# Patient Record
Sex: FEMALE | Race: BLACK OR AFRICAN AMERICAN | NOT HISPANIC OR LATINO | URBAN - METROPOLITAN AREA
[De-identification: names, ages, dates, MRNs, and addresses within clinical notes are randomized per-mention and may not be internally consistent; named-entity substitution may affect disease eponyms.]

---

## 2020-08-16 ENCOUNTER — INPATIENT (INPATIENT)
Facility: HOSPITAL | Age: 80
LOS: 3 days | Discharge: HOME | End: 2020-08-20
Attending: INTERNAL MEDICINE | Admitting: INTERNAL MEDICINE
Payer: MEDICARE

## 2020-08-16 VITALS
SYSTOLIC BLOOD PRESSURE: 147 MMHG | TEMPERATURE: 98 F | DIASTOLIC BLOOD PRESSURE: 55 MMHG | HEART RATE: 91 BPM | RESPIRATION RATE: 16 BRPM

## 2020-08-16 LAB
APPEARANCE UR: CLEAR — SIGNIFICANT CHANGE UP
BASE EXCESS BLDV CALC-SCNC: 3.7 MMOL/L — SIGNIFICANT CHANGE UP
BILIRUB UR-MCNC: NEGATIVE — SIGNIFICANT CHANGE UP
BLOOD GAS VENOUS - CREATININE: 0.73 MG/DL — SIGNIFICANT CHANGE UP (ref 0.5–1.3)
BLOOD GAS VENOUS - FIO2: 21 — SIGNIFICANT CHANGE UP
BLOOD UR QL VISUAL: NEGATIVE — SIGNIFICANT CHANGE UP
CHLORIDE BLDV-SCNC: 104 MMOL/L — SIGNIFICANT CHANGE UP (ref 96–108)
COLOR SPEC: COLORLESS — SIGNIFICANT CHANGE UP
GAS PNL BLDV: 135 MMOL/L — LOW (ref 136–146)
GLUCOSE BLDV-MCNC: 130 MG/DL — HIGH (ref 70–99)
GLUCOSE UR-MCNC: NEGATIVE — SIGNIFICANT CHANGE UP
HCO3 BLDV-SCNC: 26 MMOL/L — SIGNIFICANT CHANGE UP (ref 20–27)
HCT VFR BLD CALC: 41.5 % — SIGNIFICANT CHANGE UP (ref 34.5–45)
HCT VFR BLDV CALC: 41.6 % — SIGNIFICANT CHANGE UP (ref 34.5–45)
HGB BLD-MCNC: 12.8 G/DL — SIGNIFICANT CHANGE UP (ref 11.5–15.5)
HGB BLDV-MCNC: 13.5 G/DL — SIGNIFICANT CHANGE UP (ref 11.5–15.5)
KETONES UR-MCNC: NEGATIVE — SIGNIFICANT CHANGE UP
LACTATE BLDV-MCNC: 2.3 MMOL/L — HIGH (ref 0.5–2)
LEUKOCYTE ESTERASE UR-ACNC: NEGATIVE — SIGNIFICANT CHANGE UP
MCHC RBC-ENTMCNC: 22.4 PG — LOW (ref 27–34)
MCHC RBC-ENTMCNC: 30.8 % — LOW (ref 32–36)
MCV RBC AUTO: 72.6 FL — LOW (ref 80–100)
NITRITE UR-MCNC: NEGATIVE — SIGNIFICANT CHANGE UP
PCO2 BLDV: 44 MMHG — SIGNIFICANT CHANGE UP (ref 41–51)
PH BLDV: 7.42 PH — SIGNIFICANT CHANGE UP (ref 7.32–7.43)
PH UR: 6.5 — SIGNIFICANT CHANGE UP (ref 5–8)
PLATELET # BLD AUTO: 352 K/UL — SIGNIFICANT CHANGE UP (ref 150–400)
PMV BLD: 10.8 FL — SIGNIFICANT CHANGE UP (ref 7–13)
PO2 BLDV: < 24 MMHG — LOW (ref 35–40)
POTASSIUM BLDV-SCNC: 3.7 MMOL/L — SIGNIFICANT CHANGE UP (ref 3.4–4.5)
PROT UR-MCNC: NEGATIVE — SIGNIFICANT CHANGE UP
RBC # BLD: 5.72 M/UL — HIGH (ref 3.8–5.2)
RBC # FLD: 14.8 % — HIGH (ref 10.3–14.5)
SAO2 % BLDV: 22.9 % — LOW (ref 60–85)
SP GR SPEC: 1 — LOW (ref 1–1.04)
UROBILINOGEN FLD QL: NORMAL — SIGNIFICANT CHANGE UP
WBC # BLD: 13.71 K/UL — HIGH (ref 3.8–10.5)
WBC # FLD AUTO: 13.71 K/UL — HIGH (ref 3.8–10.5)

## 2020-08-16 RX ORDER — SODIUM CHLORIDE 9 MG/ML
1000 INJECTION, SOLUTION INTRAVENOUS ONCE
Refills: 0 | Status: COMPLETED | OUTPATIENT
Start: 2020-08-16 | End: 2020-08-16

## 2020-08-16 RX ORDER — ONDANSETRON 8 MG/1
4 TABLET, FILM COATED ORAL ONCE
Refills: 0 | Status: COMPLETED | OUTPATIENT
Start: 2020-08-16 | End: 2020-08-16

## 2020-08-16 RX ORDER — FAMOTIDINE 10 MG/ML
20 INJECTION INTRAVENOUS ONCE
Refills: 0 | Status: COMPLETED | OUTPATIENT
Start: 2020-08-16 | End: 2020-08-16

## 2020-08-16 RX ORDER — ONDANSETRON 8 MG/1
4 TABLET, FILM COATED ORAL ONCE
Refills: 0 | Status: DISCONTINUED | OUTPATIENT
Start: 2020-08-16 | End: 2020-08-16

## 2020-08-16 RX ORDER — MORPHINE SULFATE 50 MG/1
4 CAPSULE, EXTENDED RELEASE ORAL ONCE
Refills: 0 | Status: DISCONTINUED | OUTPATIENT
Start: 2020-08-16 | End: 2020-08-16

## 2020-08-16 RX ADMIN — Medication 30 MILLILITER(S): at 23:51

## 2020-08-16 RX ADMIN — FAMOTIDINE 20 MILLIGRAM(S): 10 INJECTION INTRAVENOUS at 23:51

## 2020-08-16 RX ADMIN — SODIUM CHLORIDE 1000 MILLILITER(S): 9 INJECTION, SOLUTION INTRAVENOUS at 23:53

## 2020-08-16 NOTE — ED ADULT NURSE NOTE - SUICIDE SCREENING QUESTION 3
Discharge instructions given and discussed. RX for clindamycin given and pt educated. Pt educated to come back to ER for new or worsening symptoms and follow up with PCP as instructed. Pt verbalized understanding. VSS. Pt  Discharged in stable condition.     
No

## 2020-08-16 NOTE — ED PROVIDER NOTE - OBJECTIVE STATEMENT
79 y/o female with HTN presenting with abdominal pain and vomiting since this morning. Patient was awoken from sleep with abdominal pain and ran to the bathroom to vomit. Patient localizes abdominal pain to b/l upper quadrants and described as pressure. Patient had 3-4 episodes of non-bloody emesis. Patient has associated palpitations (denies chest pain), nausea, chills but denies diarrhea (no blood in stool), fevers, or cough. Patient denies dysuria but does report increased urinary frequency.

## 2020-08-16 NOTE — ED ADULT TRIAGE NOTE - CHIEF COMPLAINT QUOTE
pt  c/o abd pain, nausea and multiple episodes of NBNB vomiting since 5 am. Pt states she hasn't been able to keep anything down. pt denies any new or different foods. pt LBM was this normal with no dark stools noted. No complaints of chest pain, headache, dizziness,  SOB, fever, chills verbalized.

## 2020-08-16 NOTE — ED PROVIDER NOTE - ATTENDING CONTRIBUTION TO CARE
80F p/w abd pain and vomiting x 1 day.  Multiple episodes of vomiting.  Unable to macario PO.  Woke up out of sleep with the pain.  Upper quadrant pain.  NBNB vomiting.  No diarrhea.  (+)chills no fever.  Also having palps.  RUQ ttp, epig ttp.  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  H/O HTN and CAYDEN.  Here having pain, nausea/retching, chills, febrile, tachycardic.  Rx zosyn, check labs and US - concern for acute karolyn - surg eval.  VS:  unremarkable    GEN - malaise, mild distress retching and chills;   A+O x3   HEAD - NC/AT     ENT - PEERL, EOMI, mucous membranes   dry, no discharge      NECK: Neck supple, non-tender without lymphadenopathy, no masses, no JVD  PULM - CTA b/l,  symmetric breath sounds  COR -  normal heart sounds    ABD - , ND,epig and RUQ abd ttp, soft,  BACK - no CVA tenderness, nontender spine     EXTREMS - no edema, no deformity, warm and well perfused    SKIN - no rash    or bruising      NEUROLOGIC - alert, face symmetric, speech fluent, sensation nl, motor no focal deficit.

## 2020-08-16 NOTE — ED PROVIDER NOTE - PHYSICAL EXAMINATION
Gen: WDWN. Patient shaking from chills and in mild distress due to pain   HEENT: EOMI, no nasal discharge, mucous membranes moist  CV: RRR, +S1/S2, no M/R/G  Resp: CTAB, no W/R/R  GI: Abdomen soft non-distended, tender in RUQ and epigastric region, no masses  MSK: No open wounds, no bruising, no LE edema  Neuro: A&Ox4, following commands, moving all four extremities spontaneously  Skin: Mildly warm to touch

## 2020-08-16 NOTE — ED PROVIDER NOTE - NS ED ROS FT
Gen: Chills but denies fever, weight loss  CV: Palpitations but denies chest pain  Skin: Denies rash, erythema, color changes  Resp: Denies SOB, cough  Endo: Increased urinary frequency. Denies sensitivity to heat, cold  GI: B/l upper quadrant pain, 3-4 episodes of non-bloody emesis, nausea. Denies diarrhea, constipation  Msk: Denies back pain, LE swelling, extremity pain  : Increased frequency. Denies dysuria   Neuro: Denies LOC, weakness, seizures

## 2020-08-16 NOTE — ED PROVIDER NOTE - PROGRESS NOTE DETAILS
Steve Grady DO: received patient from Dr Espinoza. workup concerning for cholecystitis. evaluated by surgery team who requests admission to their service for further management

## 2020-08-16 NOTE — ED ADULT NURSE NOTE - OBJECTIVE STATEMENT
Pt presents to room 26, A&Ox3, ambulatory at baseline without assistance, here for evaluation of abdominal pain nausea, and multiple episodes of vomiting. Pt denies any fevers or urinary symptoms. Denies any dizziness, shortness of breath, palpitations, diarrhea, constipation, or chills. IV established in left ac with a 20g, labs drawn and sent, call bell in reach, side rails up, bed in locked position, md evaluation in progress, will continue to monitor. Pt presents to room 26, A&Ox3, ambulatory at baseline with a cane, pmhx of htn, here for evaluation of abdominal pain nausea, and multiple episodes of vomiting that started this morning. Pt denies any fevers or urinary symptoms. Denies any dizziness, shortness of breath, palpitations, diarrhea, constipation, or chills. IV established in left ac with a 20g, labs drawn and sent, call bell in reach, side rails up, bed in locked position, md evaluation in progress, will continue to monitor.

## 2020-08-16 NOTE — ED ADULT NURSE NOTE - NSIMPLEMENTINTERV_GEN_ALL_ED
Implemented All Universal Safety Interventions:  [place] to call system. Call bell, personal items and telephone within reach. Instruct patient to call for assistance. Room bathroom lighting operational. Non-slip footwear when patient is off stretcher. Physically safe environment: no spills, clutter or unnecessary equipment. Stretcher in lowest position, wheels locked, appropriate side rails in place.

## 2020-08-16 NOTE — ED PROVIDER NOTE - CLINICAL SUMMARY MEDICAL DECISION MAKING FREE TEXT BOX
79 y/o female with pmhx of HTN presenting with RUQ/epigastric abdominal pain and 3-4x of emesis with rectal temperature of 100.6 with associated palpitations and increased urinary frequency. RUQ ultrasound to r/o cholecystitis, 1L LR, and basics/lipase. EKG/Trops for palpitations and possible atypical ACS presentation. Malox/pepcid for belly pain and morphine if no response. 79 y/o female with pmhx of HTN presenting with RUQ/epigastric abdominal pain and 3-4x of emesis with rectal temperature of 100.6 with associated palpitations and increased urinary frequency. RUQ ultrasound to r/o cholecystitis, 1L LR, and basics/lipase. EKG/Trops for palpitations and possible atypical ACS presentation. UA/UC for urinary symptoms. Malox/pepcid/zofran for belly pain/vomiting and morphine if no response.

## 2020-08-17 DIAGNOSIS — Z90.710 ACQUIRED ABSENCE OF BOTH CERVIX AND UTERUS: Chronic | ICD-10-CM

## 2020-08-17 DIAGNOSIS — K81.9 CHOLECYSTITIS, UNSPECIFIED: ICD-10-CM

## 2020-08-17 LAB
ALBUMIN SERPL ELPH-MCNC: 3.7 G/DL — SIGNIFICANT CHANGE UP (ref 3.3–5)
ALP SERPL-CCNC: 50 U/L — SIGNIFICANT CHANGE UP (ref 40–120)
ALT FLD-CCNC: 53 U/L — HIGH (ref 4–33)
ANION GAP SERPL CALC-SCNC: 15 MMO/L — HIGH (ref 7–14)
AST SERPL-CCNC: 86 U/L — HIGH (ref 4–32)
BASE EXCESS BLDV CALC-SCNC: 2.4 MMOL/L — SIGNIFICANT CHANGE UP
BASOPHILS # BLD AUTO: 0.03 K/UL — SIGNIFICANT CHANGE UP (ref 0–0.2)
BASOPHILS NFR BLD AUTO: 0.2 % — SIGNIFICANT CHANGE UP (ref 0–2)
BILIRUB SERPL-MCNC: 0.8 MG/DL — SIGNIFICANT CHANGE UP (ref 0.2–1.2)
BLOOD GAS VENOUS - CREATININE: 0.7 MG/DL — SIGNIFICANT CHANGE UP (ref 0.5–1.3)
BUN SERPL-MCNC: 6 MG/DL — LOW (ref 7–23)
CALCIUM SERPL-MCNC: 9.4 MG/DL — SIGNIFICANT CHANGE UP (ref 8.4–10.5)
CHLORIDE BLDV-SCNC: 106 MMOL/L — SIGNIFICANT CHANGE UP (ref 96–108)
CHLORIDE SERPL-SCNC: 102 MMOL/L — SIGNIFICANT CHANGE UP (ref 98–107)
CO2 SERPL-SCNC: 20 MMOL/L — LOW (ref 22–31)
CREAT SERPL-MCNC: 0.65 MG/DL — SIGNIFICANT CHANGE UP (ref 0.5–1.3)
CULTURE RESULTS: SIGNIFICANT CHANGE UP
EOSINOPHIL # BLD AUTO: 0.02 K/UL — SIGNIFICANT CHANGE UP (ref 0–0.5)
EOSINOPHIL NFR BLD AUTO: 0.1 % — SIGNIFICANT CHANGE UP (ref 0–6)
GAS PNL BLDV: 136 MMOL/L — SIGNIFICANT CHANGE UP (ref 136–146)
GLUCOSE BLDV-MCNC: 134 MG/DL — HIGH (ref 70–99)
GLUCOSE SERPL-MCNC: 116 MG/DL — HIGH (ref 70–99)
HCO3 BLDV-SCNC: 25 MMOL/L — SIGNIFICANT CHANGE UP (ref 20–27)
HCT VFR BLDV CALC: 36.4 % — SIGNIFICANT CHANGE UP (ref 34.5–45)
HGB BLDV-MCNC: 11.8 G/DL — SIGNIFICANT CHANGE UP (ref 11.5–15.5)
IMM GRANULOCYTES NFR BLD AUTO: 0.3 % — SIGNIFICANT CHANGE UP (ref 0–1.5)
LACTATE BLDV-MCNC: 2 MMOL/L — SIGNIFICANT CHANGE UP (ref 0.5–2)
LIDOCAIN IGE QN: 22.6 U/L — SIGNIFICANT CHANGE UP (ref 7–60)
LYMPHOCYTES # BLD AUTO: 1.5 K/UL — SIGNIFICANT CHANGE UP (ref 1–3.3)
LYMPHOCYTES # BLD AUTO: 10.9 % — LOW (ref 13–44)
MAGNESIUM SERPL-MCNC: 1.9 MG/DL — SIGNIFICANT CHANGE UP (ref 1.6–2.6)
MONOCYTES # BLD AUTO: 0.82 K/UL — SIGNIFICANT CHANGE UP (ref 0–0.9)
MONOCYTES NFR BLD AUTO: 6 % — SIGNIFICANT CHANGE UP (ref 2–14)
NEUTROPHILS # BLD AUTO: 11.3 K/UL — HIGH (ref 1.8–7.4)
NEUTROPHILS NFR BLD AUTO: 82.5 % — HIGH (ref 43–77)
NRBC # FLD: 0 K/UL — SIGNIFICANT CHANGE UP (ref 0–0)
PCO2 BLDV: 50 MMHG — SIGNIFICANT CHANGE UP (ref 41–51)
PH BLDV: 7.36 PH — SIGNIFICANT CHANGE UP (ref 7.32–7.43)
PHOSPHATE SERPL-MCNC: 3.8 MG/DL — SIGNIFICANT CHANGE UP (ref 2.5–4.5)
PO2 BLDV: 26 MMHG — LOW (ref 35–40)
POTASSIUM BLDV-SCNC: 3.6 MMOL/L — SIGNIFICANT CHANGE UP (ref 3.4–4.5)
POTASSIUM SERPL-MCNC: 4.2 MMOL/L — SIGNIFICANT CHANGE UP (ref 3.5–5.3)
POTASSIUM SERPL-SCNC: 4.2 MMOL/L — SIGNIFICANT CHANGE UP (ref 3.5–5.3)
PROT SERPL-MCNC: 7 G/DL — SIGNIFICANT CHANGE UP (ref 6–8.3)
SAO2 % BLDV: 38.7 % — LOW (ref 60–85)
SARS-COV-2 RNA SPEC QL NAA+PROBE: SIGNIFICANT CHANGE UP
SODIUM SERPL-SCNC: 137 MMOL/L — SIGNIFICANT CHANGE UP (ref 135–145)
SPECIMEN SOURCE: SIGNIFICANT CHANGE UP
TROPONIN T, HIGH SENSITIVITY: 8 NG/L — SIGNIFICANT CHANGE UP (ref ?–14)
TROPONIN T, HIGH SENSITIVITY: 9 NG/L — SIGNIFICANT CHANGE UP (ref ?–14)

## 2020-08-17 PROCEDURE — 76705 ECHO EXAM OF ABDOMEN: CPT | Mod: 26

## 2020-08-17 PROCEDURE — 99223 1ST HOSP IP/OBS HIGH 75: CPT

## 2020-08-17 PROCEDURE — 74183 MRI ABD W/O CNTR FLWD CNTR: CPT | Mod: 26

## 2020-08-17 PROCEDURE — 99284 EMERGENCY DEPT VISIT MOD MDM: CPT

## 2020-08-17 RX ORDER — PIPERACILLIN AND TAZOBACTAM 4; .5 G/20ML; G/20ML
3.38 INJECTION, POWDER, LYOPHILIZED, FOR SOLUTION INTRAVENOUS ONCE
Refills: 0 | Status: COMPLETED | OUTPATIENT
Start: 2020-08-17 | End: 2020-08-17

## 2020-08-17 RX ORDER — PIPERACILLIN AND TAZOBACTAM 4; .5 G/20ML; G/20ML
3.38 INJECTION, POWDER, LYOPHILIZED, FOR SOLUTION INTRAVENOUS EVERY 8 HOURS
Refills: 0 | Status: DISCONTINUED | OUTPATIENT
Start: 2020-08-17 | End: 2020-08-19

## 2020-08-17 RX ORDER — OXYCODONE HYDROCHLORIDE 5 MG/1
5 TABLET ORAL EVERY 4 HOURS
Refills: 0 | Status: DISCONTINUED | OUTPATIENT
Start: 2020-08-17 | End: 2020-08-20

## 2020-08-17 RX ORDER — ENOXAPARIN SODIUM 100 MG/ML
40 INJECTION SUBCUTANEOUS DAILY
Refills: 0 | Status: DISCONTINUED | OUTPATIENT
Start: 2020-08-17 | End: 2020-08-20

## 2020-08-17 RX ORDER — ASPIRIN/CALCIUM CARB/MAGNESIUM 324 MG
81 TABLET ORAL DAILY
Refills: 0 | Status: DISCONTINUED | OUTPATIENT
Start: 2020-08-17 | End: 2020-08-20

## 2020-08-17 RX ORDER — ONDANSETRON 8 MG/1
4 TABLET, FILM COATED ORAL EVERY 6 HOURS
Refills: 0 | Status: DISCONTINUED | OUTPATIENT
Start: 2020-08-17 | End: 2020-08-20

## 2020-08-17 RX ORDER — SODIUM CHLORIDE 9 MG/ML
1000 INJECTION, SOLUTION INTRAVENOUS
Refills: 0 | Status: DISCONTINUED | OUTPATIENT
Start: 2020-08-17 | End: 2020-08-18

## 2020-08-17 RX ORDER — AMLODIPINE BESYLATE 2.5 MG/1
5 TABLET ORAL DAILY
Refills: 0 | Status: DISCONTINUED | OUTPATIENT
Start: 2020-08-17 | End: 2020-08-20

## 2020-08-17 RX ORDER — ACETAMINOPHEN 500 MG
975 TABLET ORAL ONCE
Refills: 0 | Status: COMPLETED | OUTPATIENT
Start: 2020-08-17 | End: 2020-08-17

## 2020-08-17 RX ORDER — OXYCODONE HYDROCHLORIDE 5 MG/1
2.5 TABLET ORAL EVERY 4 HOURS
Refills: 0 | Status: DISCONTINUED | OUTPATIENT
Start: 2020-08-17 | End: 2020-08-20

## 2020-08-17 RX ORDER — ACETAMINOPHEN 500 MG
650 TABLET ORAL EVERY 6 HOURS
Refills: 0 | Status: DISCONTINUED | OUTPATIENT
Start: 2020-08-17 | End: 2020-08-20

## 2020-08-17 RX ADMIN — ENOXAPARIN SODIUM 40 MILLIGRAM(S): 100 INJECTION SUBCUTANEOUS at 12:55

## 2020-08-17 RX ADMIN — PIPERACILLIN AND TAZOBACTAM 25 GRAM(S): 4; .5 INJECTION, POWDER, LYOPHILIZED, FOR SOLUTION INTRAVENOUS at 17:45

## 2020-08-17 RX ADMIN — AMLODIPINE BESYLATE 5 MILLIGRAM(S): 2.5 TABLET ORAL at 03:56

## 2020-08-17 RX ADMIN — SODIUM CHLORIDE 1000 MILLILITER(S): 9 INJECTION, SOLUTION INTRAVENOUS at 02:55

## 2020-08-17 RX ADMIN — PIPERACILLIN AND TAZOBACTAM 25 GRAM(S): 4; .5 INJECTION, POWDER, LYOPHILIZED, FOR SOLUTION INTRAVENOUS at 10:59

## 2020-08-17 RX ADMIN — SODIUM CHLORIDE 125 MILLILITER(S): 9 INJECTION, SOLUTION INTRAVENOUS at 03:55

## 2020-08-17 RX ADMIN — Medication 975 MILLIGRAM(S): at 01:30

## 2020-08-17 RX ADMIN — PIPERACILLIN AND TAZOBACTAM 200 GRAM(S): 4; .5 INJECTION, POWDER, LYOPHILIZED, FOR SOLUTION INTRAVENOUS at 01:57

## 2020-08-17 RX ADMIN — PIPERACILLIN AND TAZOBACTAM 3.38 GRAM(S): 4; .5 INJECTION, POWDER, LYOPHILIZED, FOR SOLUTION INTRAVENOUS at 02:30

## 2020-08-17 RX ADMIN — Medication 975 MILLIGRAM(S): at 02:10

## 2020-08-17 RX ADMIN — MORPHINE SULFATE 4 MILLIGRAM(S): 50 CAPSULE, EXTENDED RELEASE ORAL at 01:19

## 2020-08-17 RX ADMIN — MORPHINE SULFATE 4 MILLIGRAM(S): 50 CAPSULE, EXTENDED RELEASE ORAL at 00:08

## 2020-08-17 RX ADMIN — ONDANSETRON 4 MILLIGRAM(S): 8 TABLET, FILM COATED ORAL at 00:08

## 2020-08-17 RX ADMIN — Medication 81 MILLIGRAM(S): at 12:54

## 2020-08-17 NOTE — H&P ADULT - ASSESSMENT
ASSESSMENT:  Mattie is a very pleasant 80 year old lady with acute cholecystitis    PLAN:  - Admit to surgery, Dr. Davila  - MRCP to evaluate for choledocholithiasis  - NPO, IVF  - IV Abx: Zosyn  - Pain control    Jose Aguilar, PGY 3  Surgery [id number]

## 2020-08-17 NOTE — CONSULT NOTE ADULT - ATTENDING COMMENTS
cholecystitis with negative MRCP for stones in the biliary system.    No endoscopic intervention at this time.
Patient without history of ischemic heart disease or CVA. Her HTN is well managed with amlodipine. She denies exertional chest pain or dyspnea and is able to climb up to 3 flights of stairs and walk 5 blocks without stopping -- independent of ADLs and IADLs. Low RCRI score. EKG with ST inversions in V2, V3, yet poor quality EKG -- would repeat. This is a low risk patient for a laparoscopic cholecystectomy, i.e. a low risk procedure. She is medically optimized for surgery.

## 2020-08-17 NOTE — H&P ADULT - NSHPLABSRESULTS_GEN_ALL_CORE
< from: US Abdomen Limited (08.17.20 @ 00:54) >      EXAM:  US ABDOMEN LIMITED        PROCEDURE DATE:  Aug 17 2020         INTERPRETATION:  CLINICAL INFORMATION: Right upper quadrant pain and tenderness    COMPARISON: None available.    TECHNIQUE: Sonography of the right upper quadrant.    FINDINGS:    Liver: Liver measures 18.5 cm with normal contour, texture and echogenicity. Hepatopedal flow in the main portal vein.  Bile ducts: Extrahepatic and mild intrahepatic biliary ductal dilatation. Common bile duct measures 10 mm. Distal common bile duct is obscured by bowel gas.  Gallbladder: Cholelithiasis and sludge with pericholecystic fluid and positive sonographic Ortega sign. Gallbladder wall is borderline thickened measuring 3 mm.  Pancreas: Visualized portions are within normal limits.  Right kidney: 11.0 cm. No hydronephrosis. Right renal cyst measures 3 cm  Ascites: None.  IVC: Visualized portions are within normal limits.    IMPRESSION:    Sonographic findings concerning for acute cholecystitis.    Dilated common bile duct with mild intrahepatic biliary ductal dilatation. Cannot exclude distal choledocholithiasis as the distal common bile duct is obscured by bowel gas.    Hepatomegaly.            JONNY LUO M.D., RESIDENT RADIOLOGIST  This document has been electronically signed.  CHARLY GOSS M.D., ATTENDING RADIOLOGIST  This document has been electronically signed. Aug 17 2020  1:53AM                  < end of copied text >

## 2020-08-17 NOTE — CHART NOTE - NSCHARTNOTEFT_GEN_A_CORE
CAPRINI SCORE [CLOT]    AGE RELATED RISK FACTORS                                                       MOBILITY RELATED FACTORS  [ ] Age 41-60 years                                            (1 Point)                  [ ] Bed rest                                                        (1 Point)  [ ] Age: 61-74 years                                           (2 Points)                 [ ] Plaster cast                                                   (2 Points)  [x] Age= 75 years                                              (3 Points)                 [ ] Bed bound for more than 72 hours                 (2 Points)    DISEASE RELATED RISK FACTORS                                               GENDER SPECIFIC FACTORS  [ ] Edema in the lower extremities                       (1 Point)                  [ ] Pregnancy                                                     (1 Point)  [ ] Varicose veins                                               (1 Point)                  [ ] Post-partum < 6 weeks                                   (1 Point)             [ ] BMI > 25 Kg/m2                                            (1 Point)                  [ ] Hormonal therapy  or oral contraception          (1 Point)                 [ ] Sepsis (in the previous month)                        (1 Point)                  [ ] History of pregnancy complications                 (1 point)  [ ] Pneumonia or serious lung disease                                               [ ] Unexplained or recurrent                     (1 Point)           (in the previous month)                               (1 Point)  [ ] Abnormal pulmonary function test                     (1 Point)                 SURGERY RELATED RISK FACTORS  [ ] Acute myocardial infarction                              (1 Point)                 [ ]  Section                                             (1 Point)  [ ] Congestive heart failure (in the previous month)  (1 Point)               [ ] Minor surgery                                                  (1 Point)   [ ] Inflammatory bowel disease                             (1 Point)                 [ ] Arthroscopic surgery                                        (2 Points)  [ ] Central venous access                                      (2 Points)                [ ] General surgery lasting more than 45 minutes   (2 Points)       [ ] Stroke (in the previous month)                          (5 Points)               [ ] Elective arthroplasty                                         (5 Points)                                                                                                                                               HEMATOLOGY RELATED FACTORS                                                 TRAUMA RELATED RISK FACTORS  [ ] Prior episodes of VTE                                     (3 Points)                [ ] Fracture of the hip, pelvis, or leg                       (5 Points)  [ ] Positive family history for VTE                         (3 Points)                 [ ] Acute spinal cord injury (in the previous month)  (5 Points)  [ ] Prothrombin 88427 A                                     (3 Points)                 [ ] Paralysis  (less than 1 month)                             (5 Points)  [ ] Factor V Leiden                                             (3 Points)                  [ ] Multiple Trauma within 1 month                        (5 Points)  [ ] Lupus anticoagulants                                     (3 Points)                                                           [ ] Anticardiolipin antibodies                               (3 Points)                                                       [ ] High homocysteine in the blood                      (3 Points)                                             [ ] Other congenital or acquired thrombophilia      (3 Points)                                                [ ] Heparin induced thrombocytopenia                  (3 Points)                                          Total Score [  3  ]    Caprini Score 0 - 2:  Low Risk, No VTE Prophylaxis required for most patients, encourage ambulation  Caprini Score 3 - 6:  At Risk, pharmacologic VTE prophylaxis is indicated for most patients (in the absence of a contraindication)  Caprini Score Greater than or = 7:  High Risk, pharmacologic VTE prophylaxis is indicated for most patients (in the absence of a contraindication)

## 2020-08-17 NOTE — H&P ADULT - NSHPPHYSICALEXAM_GEN_ALL_CORE
General: Well-developed elderly female, in no acute distress   Neurologic: Awake, alert, GCS 15, No focal Deficits   Respiratory: Normal respiratory effort  CVS: RRR, perfusing adequately  Abdomen: Abdomen soft, ND, moderate epigastric and RUQ TTP, +Ortega's sign, no rebound or guarding   Ext: Grossly symmetric, Moving all extremities  Skin: Warm, dry, intact, no erythema, no signs of jaundice

## 2020-08-17 NOTE — H&P ADULT - HISTORY OF PRESENT ILLNESS
Patient is a 80y old  Female who presents with a chief complaint of abdominal pain    HPI:   Mattie is a very pleasant 80 year old lady with history of HTN, CAYDEN, presenting to ED c/o abdominal pain. Patient reports pain started 1 day ago upon awakening, and was associated with nausea and vomiting. Pt had 2 more episodes of vomiting throughout the day along with constant pain in the epigastric and RUQ areas, prompting her to present to the ED in the evening. Pain described as dull, 8/10 in severity, non-radiating. Pt has previously had 3 similar episodes with the first being in 2017, but was never told she had gallstones. Last BM was this morning. Denies any other complaints including recent illness/sick contacts, fevers/chills, chest pain/shortness of breath, diarrhea/constipation.     ROS: 10-system review is otherwise negative except HPI above.      PAST MEDICAL & SURGICAL HISTORY:  HTN  Hysterectomy    FAMILY HISTORY:  NC  [] Family history not pertinent as reviewed with the patient and family    SOCIAL HISTORY:    Nonsmoker  Nondrinker  Denies Illicit drug use     ALLERGIES: No Known Allergies      HOME MEDICATIONS:   aspirin 81 mg oral tablet, chewable: 1 tab(s) orally once a day (17 Aug 2020 02:45)  Norvasc 5 mg oral tablet: 1 tab(s) orally once a day (17 Aug 2020 02:45)    --------------------------------------------------------------------------------------------

## 2020-08-17 NOTE — H&P ADULT - ATTENDING COMMENTS
I agree with the above history, physical, and plan which I have reviewed and edited where appropriate.    Steve Davila MD  Acute and Critical Care Surgery    [facility] Acute Care Surgery (B Team) Attending Group Practice:  Dr. Micah Le, Dr. Alexis Miller, Dr. Steve Davila, and Dr. Dashawn Mitchell    Urgent issues - spectra [phone] or [phone]  Nonurgent issues - [phone]  Patient appointments or after hours - [phone]

## 2020-08-17 NOTE — CONSULT NOTE ADULT - ASSESSMENT
Impression:  # Dilated CBD/Intrahepatics: Concern for possible choledocholithasis  # Acute Cholecystitis     Recommendation:  - Follow up MRCP  - Supportive care per primary team    Mery Spencer MD  Gastroenterology Fellow  [phone]  Available on Microsoft Teams  Please page on call fellow on weekends and after 5pm on weekdays: [phone]
79 yo F with Pmhx of HTN and CAYDEN presented initially with abdominal pain, found to have evidence of acute cholecystectomy, pending surgical intervention. Medicine was consulted for pre-op risk stratification.     #Recs  -Patient has no chest pain or SOB on exertion. Her METS >4 and RCRI is 0. She is also euvolemic on exam.   -EKG shows T-wave inversion in the lateral leads. However, the quality of the EKG is poor. Please repeat EKG today to see if the changes are persistent.   -Currently, patient is a low risk candidate for low risk surgery. She is medically optimized for possible laparoscopic cholecystectomy.   -Please call us if you have any further questions.

## 2020-08-17 NOTE — CONSULT NOTE ADULT - SUBJECTIVE AND OBJECTIVE BOX
Chief Complaint:  Patient is a 80y old  Female who presents with a chief complaint of abdominal pain    HPI:  80 year old woman with hx of Hypertension presents with one day of abdominal pain. Patient reports dull, 8/10, nonradiating and constant epigastric pain associated with roughly 3 episodes of NBNB emesis. She denies fevers, chills, chest pain, shortness of breath, melena, hematochezia, hematemesis, diarrhea, constipation and palpitations. Found to have possible cholecystitis on US as well as "dilated" CBD and intrahepatics. GI consulted for further evaluation. Patient reports significant improvement in her abdominal pain since admission.    Allergies:  No Known Allergies    Home Medications:  Reviewed    Hospital Medications:  acetaminophen   Tablet .. 650 milliGRAM(s) Oral every 6 hours PRN  amLODIPine   Tablet 5 milliGRAM(s) Oral daily  aspirin  chewable 81 milliGRAM(s) Oral daily  enoxaparin Injectable 40 milliGRAM(s) SubCutaneous daily  lactated ringers. 1000 milliLiter(s) IV Continuous <Continuous>  ondansetron Injectable 4 milliGRAM(s) IV Push every 6 hours PRN  oxyCODONE    IR 2.5 milliGRAM(s) Oral every 4 hours PRN  oxyCODONE    IR 5 milliGRAM(s) Oral every 4 hours PRN  piperacillin/tazobactam IVPB.. 3.375 Gram(s) IV Intermittent every 8 hours    PMHX/PSHX:    Hypertension    Family history:    No pertinent family hx    Social History:   No history of tobacco use, EtOH use or illicit drug use     ROS:   General:  No fevers, chills  ENT:  No sore throat or dysphagia  CV:  No pain or palpitations  Resp:  No dyspnea, cough, wheezing  GI:  No pain, No nausea, No vomiting, , No tarry stools  Skin:  No rash or edema    PHYSICAL EXAM:   GENERAL:  NAD, Appears stated age  HEENT:  NC/AT,  conjunctivae clear and pink  CHEST:  CTA B/L, Normal effort  HEART:  RRR S1/S2  ABDOMEN:  Soft, non-tender, non-distended, BS+  EXTEREMITIES:  No cyanosis  SKIN:  Warm & Dry  NEURO:  Alert, oriented    Vital Signs:  Vital Signs Last 24 Hrs  T(C): 36.9 (17 Aug 2020 06:33), Max: 38.1 (17 Aug 2020 00:07)  T(F): 98.4 (17 Aug 2020 06:33), Max: 100.6 (17 Aug 2020 00:07)  HR: 70 (17 Aug 2020 06:33) (68 - 91)  BP: 147/77 (17 Aug 2020 06:33) (147/55 - 149/76)  BP(mean): --  RR: 16 (17 Aug 2020 06:33) (16 - 18)  SpO2: 100% (17 Aug 2020 06:33) (100% - 100%)  Daily Height in cm: 154.94 (17 Aug 2020 06:33)    Daily     LABS:                        12.8   13.71 )-----------( 352      ( 16 Aug 2020 23:40 )             41.5     Mean Cell Volume: 72.6 fL (20 @ 23:40)        137  |  102  |  6<L>  ----------------------------<  116<H>  4.2   |  20<L>  |  0.65    Ca    9.4      17 Aug 2020 08:00  Phos  3.8     08  Mg     1.9         TPro  7.0  /  Alb  3.7  /  TBili  0.8  /  DBili  x   /  AST  86<H>  /  ALT  53<H>  /  AlkPhos  50  17    LIVER FUNCTIONS - ( 17 Aug 2020 08:00 )  Alb: 3.7 g/dL / Pro: 7.0 g/dL / ALK PHOS: 50 u/L / ALT: 53 u/L / AST: 86 u/L / GGT: x             Urinalysis Basic - ( 16 Aug 2020 22:30 )    Color: COLORLESS / Appearance: CLEAR / S.004 / pH: 6.5  Gluc: NEGATIVE / Ketone: NEGATIVE  / Bili: NEGATIVE / Urobili: NORMAL   Blood: NEGATIVE / Protein: NEGATIVE / Nitrite: NEGATIVE   Leuk Esterase: NEGATIVE / RBC: x / WBC x   Sq Epi: x / Non Sq Epi: x / Bacteria: x    Amylase Serum--      Lipase serum22.6       Ammonia--  Amylase Serum--      Lipase serum35.1       Ammonia--                          12.8   13.71 )-----------( 352      ( 16 Aug 2020 23:40 )             41.5     Imaging:

## 2020-08-17 NOTE — PROGRESS NOTE ADULT - ASSESSMENT
Mattie is a 8F with PMH significant for HTN, CAYDEN with acute cholecystitis.     - PLAN:  - MRCP to evaluate for choledocholithiasis  - GI consult for possible ERCP or EUS   - NPO, IVF  - Trend EBC  - IV Abx: Zosyn  - Pain control    Felicia Mcrae, PGY-1  B team surgery   [id number] Mattie is a 80F with PMH significant for HTN, CAYDEN with acute cholecystitis.     - PLAN:  - MRCP to evaluate for choledocholithiasis  - GI consult for possible ERCP or EUS   - NPO, IVF  - Trend EBC  - IV Abx: Zosyn  - Pain control      B team surgery   [id number] Mattie is a 80F with PMH significant for HTN, CAYDEN with acute cholecystitis.     - PLAN:  - MRCP to evaluate for choledocholithiasis  - GI consult for possible ERCP or EUS   - NPO, IVF  - Trend WBC  - IV Abx: Zosyn  - Pain control      B team surgery   [id number]

## 2020-08-17 NOTE — ED ADULT NURSE REASSESSMENT NOTE - NS ED NURSE REASSESS COMMENT FT1
break coverage RN - pt received in spot 26, A&OX4 presenting with abdominal pain & nausea/vomiting since 5AM. Pt found to be rectally febrile, MD aware. Resp even and unlabored. IVF infusing well at this time. Pt states relief from medication currently. Pending ultrasound and further orders. Warm blankets provided, bed in lowest position, call bell within reach. Will continue to monitor.

## 2020-08-17 NOTE — CONSULT NOTE ADULT - SUBJECTIVE AND OBJECTIVE BOX
79 yo F with Pmhx of HTN and CAYDEN presented initially with abdominal pain of 1 day duration. The pain was localized in the RUQ and epigastric area. It is sharp, 8/10 in severity, constant and non-radiating. It is associated with nausea and vomiting x3. She denies any fever, chills, SOB, chest pain, diarrhea, constipation, melena, hematochezia or LE edema. She was found to have leukocytosis, lactic acidosis. US showed signs of cholecystitis. MRCP showed cholelithiasis with mild gallbladder wall edema and trace pericholecystic fluid, compatible with provided history of acute cholecystitis. Patient was admitted to surgery for possible cholecystectomy. Medicine was consulted for pre-op risk stratification.   Currently, patient denies any fever, chills, abdominal pain, diarrhea, or melena. She denies any chest pain, SOB, orthopnea, palpitations, LE edema. She reports that she walks with a cane. She can walk >2 blocks w/o getting chest pain or SOB. She can also walk 2 flights of stairs w/o any chest pain or SOB. She is able to take care of ADLs w/o any issue. She denies any hx of CVA, ACS, HPI:     79 yo F with Pmhx of HTN and CAYDEN presented initially with abdominal pain of 1 day duration. The pain was localized in the RUQ and epigastric area. It is sharp, 8/10 in severity, constant and non-radiating. It is associated with nausea and vomiting x3. She denies any fever, chills, SOB, chest pain, diarrhea, constipation, melena, hematochezia or LE edema. She was found to have leukocytosis, lactic acidosis. US showed signs of cholecystitis. MRCP showed cholelithiasis with mild gallbladder wall edema and trace pericholecystic fluid, compatible with provided history of acute cholecystitis. Patient was admitted to surgery for possible cholecystectomy. Medicine was consulted for pre-op risk stratification.   Currently, patient denies any fever, chills, abdominal pain, diarrhea, or melena. She denies any chest pain, SOB, orthopnea, palpitations, LE edema. She reports that she walks with a cane. She can walk >2 blocks w/o getting chest pain or SOB. She can also walk 2 flights of stairs w/o any chest pain or SOB. She is able to take care of ADLs w/o any issue. She denies any hx of CVA, ACS, DMII, and kidney disease. She had surgery before and had no allergic reaction to anesthesia or abnormal bleeding.     PAST MEDICAL & SURGICAL HISTORY:  Hypertension  Status post total abdominal hysterectomy    Family Hx  Possible cardiac hx in mother-but unclear    Home Medications:  aspirin 81 mg oral tablet, chewable: 1 tab(s) orally once a day (17 Aug 2020 02:45)  Norvasc 5 mg oral tablet: 1 tab(s) orally once a day (17 Aug 2020 02:45)      Social History:  She lives with her . She denies any alcohol consumptions, She denies any smoking hx.    Allergies: None       Vital Signs Last 24 Hrs  T(C): 36.4 (17 Aug 2020 14:25), Max: 38.1 (17 Aug 2020 00:07)  T(F): 97.6 (17 Aug 2020 14:25), Max: 100.6 (17 Aug 2020 00:07)  HR: 71 (17 Aug 2020 14:25) (68 - 91)  BP: 140/62 (17 Aug 2020 14:25) (140/62 - 149/76)  BP(mean): --  RR: 16 (17 Aug 2020 14:25) (16 - 18)  SpO2: 100% (17 Aug 2020 14:25) (100% - 100%)      GENERAL: NAD, well-developed  HEENT:  Atraumatic, Normocephalic, conjunctiva and sclera clear, oral mucosa moist, clear w/o any exudate   NECK: Supple, No JVD  CHEST/LUNG: Clear to auscultation bilaterally; No wheeze  HEART: Regular rate and rhythm; No murmurs, rubs, or gallops  ABDOMEN: Soft, Nontender, Nondistended; Bowel sounds present  EXTREMITIES:  2+ Peripheral Pulses, No clubbing, cyanosis, or edema  PSYCH: AAOx3  NEUROLOGY: non-focal  SKIN: No rashes or lesions                                12.8   13.71 )-----------( 352      ( 16 Aug 2020 23:40 )             41.5     Hgb Trend: 12.8<--  08-17    137  |  102  |  6<L>  ----------------------------<  116<H>  4.2   |  20<L>  |  0.65    Ca    9.4      17 Aug 2020 08:00  Phos  3.8     08-  Mg     1.9         TPro  7.0  /  Alb  3.7  /  TBili  0.8  /  DBili  x   /  AST  86<H>  /  ALT  53<H>  /  AlkPhos  50  08-17    Creatinine Trend: 0.65<--, 0.69<--        Urinalysis Basic - ( 16 Aug 2020 22:30 )    Color: COLORLESS / Appearance: CLEAR / S.004 / pH: 6.5  Gluc: NEGATIVE / Ketone: NEGATIVE  / Bili: NEGATIVE / Urobili: NORMAL   Blood: NEGATIVE / Protein: NEGATIVE / Nitrite: NEGATIVE   Leuk Esterase: NEGATIVE / RBC: x / WBC x   Sq Epi: x / Non Sq Epi: x / Bacteria: x        < from: US Abdomen Limited (20 @ 00:54) >  IMPRESSION:    Sonographic findings concerning for acute cholecystitis.    Dilated common bile duct with mildintrahepatic biliary ductal dilatation. Cannot exclude distal choledocholithiasis as the distal common bile duct is obscured by bowel gas.    Hepatomegaly.        < end of copied text >      < from: MR MRCP w/wo IV Cont (20 @ 11:58) >  IMPRESSION:  Trace central intrahepatic biliary distention and a mildly prominent extra hepatic common bile duct measuring up to 0.9 cm with normal distal tapering. No choledocholithiasis.    Cholelithiasis with mild gallbladder wall edema and trace pericholecystic fluid, compatible with provided history of acute cholecystitis.      < end of copied text > HPI:     79 yo F with Pmhx of HTN and CAYDEN presented initially with abdominal pain of 1 day duration. The pain was localized in the RUQ and epigastric area. It is sharp, 8/10 in severity, constant and non-radiating. It is associated with nausea and vomiting x3. She denies any fever, chills, SOB, chest pain, diarrhea, constipation, melena, hematochezia or LE edema. She was found to have leukocytosis, lactic acidosis. US showed signs of cholecystitis. MRCP showed cholelithiasis with mild gallbladder wall edema and trace pericholecystic fluid, compatible with provided history of acute cholecystitis. Patient was admitted to surgery for possible cholecystectomy. Medicine was consulted for pre-op risk stratification.   Currently, patient denies any fever, chills, abdominal pain, diarrhea, or melena. She denies any chest pain, SOB, orthopnea, palpitations, LE edema. She reports that she walks with a cane. She can walk >2 blocks w/o getting chest pain or SOB. She can also walk 2 flights of stairs w/o any chest pain or SOB. She is able to take care of ADLs w/o any issue. She denies any hx of CVA, ACS, DMII, and kidney disease. She had surgery before and had no allergic reaction to anesthesia or abnormal bleeding.     REVIEW OF SYSTEMS:    CONSTITUTIONAL: No weakness, fevers or chills  EYES/ENT: No visual changes;  No vertigo or throat pain   NECK: No pain or stiffness  RESPIRATORY: No cough, wheezing, hemoptysis; No shortness of breath  CARDIOVASCULAR: No chest pain or palpitations  GASTROINTESTINAL: No abdominal or epigastric pain. No nausea, vomiting, or hematemesis; No diarrhea or constipation. No melena or hematochezia.  GENITOURINARY: No dysuria, frequency or hematuria  NEUROLOGICAL: No numbness or weakness  SKIN: No itching, rashes  MSK: No joint pain    PAST MEDICAL & SURGICAL HISTORY:  Hypertension  Status post total abdominal hysterectomy    Family Hx  Possible cardiac hx in mother-but unclear    Home Medications:  aspirin 81 mg oral tablet, chewable: 1 tab(s) orally once a day (17 Aug 2020 02:45)  Norvasc 5 mg oral tablet: 1 tab(s) orally once a day (17 Aug 2020 02:45)      Social History:  She lives with her . She denies any alcohol consumptions, She denies any smoking hx.    Allergies: None       Vital Signs Last 24 Hrs  T(C): 36.4 (17 Aug 2020 14:25), Max: 38.1 (17 Aug 2020 00:07)  T(F): 97.6 (17 Aug 2020 14:25), Max: 100.6 (17 Aug 2020 00:07)  HR: 71 (17 Aug 2020 14:25) (68 - 91)  BP: 140/62 (17 Aug 2020 14:25) (140/62 - 149/76)  BP(mean): --  RR: 16 (17 Aug 2020 14:25) (16 - 18)  SpO2: 100% (17 Aug 2020 14:) (100% - 100%)      GENERAL: NAD, well-developed  HEENT:  Atraumatic, Normocephalic, conjunctiva and sclera clear, oral mucosa moist, clear w/o any exudate   NECK: Supple, No JVD  CHEST/LUNG: Clear to auscultation bilaterally; No wheeze  HEART: Regular rate and rhythm; No murmurs, rubs, or gallops  ABDOMEN: Soft, Nontender, Nondistended; Bowel sounds present  EXTREMITIES:  2+ Peripheral Pulses, No clubbing, cyanosis, or edema  PSYCH: AAOx3  NEUROLOGY: non-focal  SKIN: No rashes or lesions                                12.8   13.71 )-----------( 352      ( 16 Aug 2020 23:40 )             41.5     Hgb Trend: 12.8<--  08-17    137  |  102  |  6<L>  ----------------------------<  116<H>  4.2   |  20<L>  |  0.65    Ca    9.4      17 Aug 2020 08:00  Phos  3.8     08-17  Mg     1.9     08-17    TPro  7.0  /  Alb  3.7  /  TBili  0.8  /  DBili  x   /  AST  86<H>  /  ALT  53<H>  /  AlkPhos  50  08-17    Creatinine Trend: 0.65<--, 0.69<--        Urinalysis Basic - ( 16 Aug 2020 22:30 )    Color: COLORLESS / Appearance: CLEAR / S.004 / pH: 6.5  Gluc: NEGATIVE / Ketone: NEGATIVE  / Bili: NEGATIVE / Urobili: NORMAL   Blood: NEGATIVE / Protein: NEGATIVE / Nitrite: NEGATIVE   Leuk Esterase: NEGATIVE / RBC: x / WBC x   Sq Epi: x / Non Sq Epi: x / Bacteria: x        < from: US Abdomen Limited (20 @ 00:54) >  IMPRESSION:    Sonographic findings concerning for acute cholecystitis.    Dilated common bile duct with mildintrahepatic biliary ductal dilatation. Cannot exclude distal choledocholithiasis as the distal common bile duct is obscured by bowel gas.    Hepatomegaly.        < end of copied text >      < from: MR MRCP w/wo IV Cont (20 @ 11:58) >  IMPRESSION:  Trace central intrahepatic biliary distention and a mildly prominent extra hepatic common bile duct measuring up to 0.9 cm with normal distal tapering. No choledocholithiasis.    Cholelithiasis with mild gallbladder wall edema and trace pericholecystic fluid, compatible with provided history of acute cholecystitis.      < end of copied text > HPI:     81 yo F with Pmhx of HTN and CAYDEN presented initially with abdominal pain of 1 day duration. The pain was localized in the RUQ and epigastric area. It is sharp, 8/10 in severity, constant and non-radiating. It is associated with nausea and vomiting x3. She denies any fever, chills, SOB, chest pain, diarrhea, constipation, melena, hematochezia or LE edema. She was found to have leukocytosis, lactic acidosis. US showed signs of cholecystitis. MRCP showed cholelithiasis with mild gallbladder wall edema and trace pericholecystic fluid, compatible with provided history of acute cholecystitis. Patient was admitted to surgery for possible cholecystectomy. Medicine was consulted for pre-op risk stratification.   Currently, patient denies any fever, chills, abdominal pain, diarrhea, or melena. She denies any chest pain, SOB, orthopnea, palpitations, LE edema. She reports that she walks with a cane. She can walk >2 blocks w/o getting chest pain or SOB. She can also walk 2 flights of stairs w/o any chest pain or SOB. She is able to take care of ADLs w/o any issue. She denies any hx of CVA, ACS, DMII, and kidney disease. She had surgery before and had no allergic reaction to anesthesia or abnormal bleeding.     REVIEW OF SYSTEMS:    CONSTITUTIONAL: No weakness, fevers or chills  EYES/ENT: No visual changes;  No vertigo or throat pain   NECK: No pain or stiffness  RESPIRATORY: No cough, wheezing, hemoptysis; No shortness of breath  CARDIOVASCULAR: No chest pain or palpitations  GASTROINTESTINAL: No abdominal or epigastric pain. No nausea, vomiting, or hematemesis; No diarrhea or constipation. No melena or hematochezia.  GENITOURINARY: No dysuria, frequency or hematuria  NEUROLOGICAL: No numbness or weakness  SKIN: No itching, rashes  MSK: No joint pain  PSYCH: No A/V hallucinations    PAST MEDICAL & SURGICAL HISTORY:  Hypertension  Status post total abdominal hysterectomy    Family Hx  Possible cardiac hx in mother-but unclear    Home Medications:  aspirin 81 mg oral tablet, chewable: 1 tab(s) orally once a day (17 Aug 2020 02:45)  Norvasc 5 mg oral tablet: 1 tab(s) orally once a day (17 Aug 2020 02:45)      Social History:  She lives with her . She denies any alcohol consumptions, She denies any smoking hx.    Allergies: None       Vital Signs Last 24 Hrs  T(C): 36.4 (17 Aug 2020 14:25), Max: 38.1 (17 Aug 2020 00:07)  T(F): 97.6 (17 Aug 2020 14:25), Max: 100.6 (17 Aug 2020 00:07)  HR: 71 (17 Aug 2020 14:) (68 - 91)  BP: 140/62 (17 Aug 2020 14:25) (140/62 - 149/76)  BP(mean): --  RR: 16 (17 Aug 2020 14:25) (16 - 18)  SpO2: 100% (17 Aug 2020 14:25) (100% - 100%)      GENERAL: NAD, well-developed  HEENT:  Atraumatic, Normocephalic, conjunctiva and sclera clear, oral mucosa moist, clear w/o any exudate   NECK: Supple, No JVD  CHEST/LUNG: Clear to auscultation bilaterally; No wheeze  HEART: Regular rate and rhythm; No murmurs, rubs, or gallops  ABDOMEN: Soft, Nontender, Nondistended; Bowel sounds present  EXTREMITIES:  2+ Peripheral Pulses, No clubbing, cyanosis, or edema  PSYCH: AAOx3  NEUROLOGY: non-focal  SKIN: No rashes or lesions                                12.8   13.71 )-----------( 352      ( 16 Aug 2020 23:40 )             41.5     Hgb Trend: 12.8<--  08-17    137  |  102  |  6<L>  ----------------------------<  116<H>  4.2   |  20<L>  |  0.65    Ca    9.4      17 Aug 2020 08:00  Phos  3.8     08-17  Mg     1.9     08-17    TPro  7.0  /  Alb  3.7  /  TBili  0.8  /  DBili  x   /  AST  86<H>  /  ALT  53<H>  /  AlkPhos  50  08-17    Creatinine Trend: 0.65<--, 0.69<--        Urinalysis Basic - ( 16 Aug 2020 22:30 )    Color: COLORLESS / Appearance: CLEAR / S.004 / pH: 6.5  Gluc: NEGATIVE / Ketone: NEGATIVE  / Bili: NEGATIVE / Urobili: NORMAL   Blood: NEGATIVE / Protein: NEGATIVE / Nitrite: NEGATIVE   Leuk Esterase: NEGATIVE / RBC: x / WBC x   Sq Epi: x / Non Sq Epi: x / Bacteria: x    EKG: NSR with TWI in V3, 4, 5, my read      < from: US Abdomen Limited (20 @ 00:54) >  IMPRESSION:    Sonographic findings concerning for acute cholecystitis.    Dilated common bile duct with mildintrahepatic biliary ductal dilatation. Cannot exclude distal choledocholithiasis as the distal common bile duct is obscured by bowel gas.    Hepatomegaly.        < end of copied text >      < from: MR MRCP w/wo IV Cont (20 @ 11:58) >  IMPRESSION:  Trace central intrahepatic biliary distention and a mildly prominent extra hepatic common bile duct measuring up to 0.9 cm with normal distal tapering. No choledocholithiasis.    Cholelithiasis with mild gallbladder wall edema and trace pericholecystic fluid, compatible with provided history of acute cholecystitis.      < end of copied text >

## 2020-08-18 ENCOUNTER — TRANSCRIPTION ENCOUNTER (OUTPATIENT)
Age: 80
End: 2020-08-18

## 2020-08-18 LAB
ANION GAP SERPL CALC-SCNC: 14 MMO/L — SIGNIFICANT CHANGE UP (ref 7–14)
APTT BLD: 29.9 SEC — SIGNIFICANT CHANGE UP (ref 27–36.3)
BLD GP AB SCN SERPL QL: NEGATIVE — SIGNIFICANT CHANGE UP
BUN SERPL-MCNC: 5 MG/DL — LOW (ref 7–23)
CALCIUM SERPL-MCNC: 9.2 MG/DL — SIGNIFICANT CHANGE UP (ref 8.4–10.5)
CHLORIDE SERPL-SCNC: 100 MMOL/L — SIGNIFICANT CHANGE UP (ref 98–107)
CO2 SERPL-SCNC: 26 MMOL/L — SIGNIFICANT CHANGE UP (ref 22–31)
CREAT SERPL-MCNC: 0.79 MG/DL — SIGNIFICANT CHANGE UP (ref 0.5–1.3)
GLUCOSE SERPL-MCNC: 96 MG/DL — SIGNIFICANT CHANGE UP (ref 70–99)
HCT VFR BLD CALC: 40.3 % — SIGNIFICANT CHANGE UP (ref 34.5–45)
HGB BLD-MCNC: 11.7 G/DL — SIGNIFICANT CHANGE UP (ref 11.5–15.5)
INR BLD: 1.17 — HIGH (ref 0.88–1.16)
MAGNESIUM SERPL-MCNC: 1.9 MG/DL — SIGNIFICANT CHANGE UP (ref 1.6–2.6)
MCHC RBC-ENTMCNC: 22 PG — LOW (ref 27–34)
MCHC RBC-ENTMCNC: 29 % — LOW (ref 32–36)
MCV RBC AUTO: 75.9 FL — LOW (ref 80–100)
NRBC # FLD: 0 K/UL — SIGNIFICANT CHANGE UP (ref 0–0)
PHOSPHATE SERPL-MCNC: 3.2 MG/DL — SIGNIFICANT CHANGE UP (ref 2.5–4.5)
PLATELET # BLD AUTO: 317 K/UL — SIGNIFICANT CHANGE UP (ref 150–400)
PMV BLD: 11.3 FL — SIGNIFICANT CHANGE UP (ref 7–13)
POTASSIUM SERPL-MCNC: 3.4 MMOL/L — LOW (ref 3.5–5.3)
POTASSIUM SERPL-SCNC: 3.4 MMOL/L — LOW (ref 3.5–5.3)
PROTHROM AB SERPL-ACNC: 13.2 SEC — SIGNIFICANT CHANGE UP (ref 10.6–13.6)
RBC # BLD: 5.31 M/UL — HIGH (ref 3.8–5.2)
RBC # FLD: 14.8 % — HIGH (ref 10.3–14.5)
RH IG SCN BLD-IMP: NEGATIVE — SIGNIFICANT CHANGE UP
SODIUM SERPL-SCNC: 140 MMOL/L — SIGNIFICANT CHANGE UP (ref 135–145)
WBC # BLD: 10.44 K/UL — SIGNIFICANT CHANGE UP (ref 3.8–10.5)
WBC # FLD AUTO: 10.44 K/UL — SIGNIFICANT CHANGE UP (ref 3.8–10.5)

## 2020-08-18 PROCEDURE — 93010 ELECTROCARDIOGRAM REPORT: CPT

## 2020-08-18 PROCEDURE — 99223 1ST HOSP IP/OBS HIGH 75: CPT | Mod: GC

## 2020-08-18 PROCEDURE — 99232 SBSQ HOSP IP/OBS MODERATE 35: CPT | Mod: 57,GC

## 2020-08-18 RX ORDER — POTASSIUM CHLORIDE 20 MEQ
40 PACKET (EA) ORAL EVERY 4 HOURS
Refills: 0 | Status: COMPLETED | OUTPATIENT
Start: 2020-08-18 | End: 2020-08-18

## 2020-08-18 RX ADMIN — Medication 81 MILLIGRAM(S): at 11:03

## 2020-08-18 RX ADMIN — Medication 40 MILLIEQUIVALENT(S): at 14:57

## 2020-08-18 RX ADMIN — Medication 40 MILLIEQUIVALENT(S): at 11:04

## 2020-08-18 RX ADMIN — PIPERACILLIN AND TAZOBACTAM 25 GRAM(S): 4; .5 INJECTION, POWDER, LYOPHILIZED, FOR SOLUTION INTRAVENOUS at 18:05

## 2020-08-18 RX ADMIN — ENOXAPARIN SODIUM 40 MILLIGRAM(S): 100 INJECTION SUBCUTANEOUS at 11:04

## 2020-08-18 RX ADMIN — PIPERACILLIN AND TAZOBACTAM 25 GRAM(S): 4; .5 INJECTION, POWDER, LYOPHILIZED, FOR SOLUTION INTRAVENOUS at 01:31

## 2020-08-18 RX ADMIN — PIPERACILLIN AND TAZOBACTAM 25 GRAM(S): 4; .5 INJECTION, POWDER, LYOPHILIZED, FOR SOLUTION INTRAVENOUS at 11:04

## 2020-08-18 NOTE — DISCHARGE NOTE PROVIDER - CARE PROVIDER_API CALL
Micah Le)  Dietitian nutritionist; Surgery; Surgical Critical Care  [address] [facility] [address] [facility], [address], [place] [address]  Phone: [phone]  Fax: [phone]  Follow Up Time: 1 week

## 2020-08-18 NOTE — DISCHARGE NOTE PROVIDER - NSDCMRMEDTOKEN_GEN_ALL_CORE_FT
aspirin 81 mg oral tablet, chewable: 1 tab(s) orally once a day  Norvasc 5 mg oral tablet: 1 tab(s) orally once a day acetaminophen 325 mg oral tablet: 2 tab(s) orally every 6 hours, As needed, Mild Pain (1 - 3)  aspirin 81 mg oral tablet, chewable: 1 tab(s) orally once a day  Norvasc 5 mg oral tablet: 1 tab(s) orally once a day acetaminophen 325 mg oral tablet: 2 tab(s) orally every 6 hours, As needed, Mild Pain (1 - 3)  amoxicillin-clavulanate 500 mg-125 mg oral tablet: 1 tab(s) orally every 12 hours MDD:2  aspirin 81 mg oral tablet, chewable: 1 tab(s) orally once a day  Norvasc 5 mg oral tablet: 1 tab(s) orally once a day  psyllium 3.4 g/7 g oral powder for reconstitution: 1 packet(s) orally 2 times a day

## 2020-08-18 NOTE — DISCHARGE NOTE PROVIDER - NSDCFUADDINST_GEN_ALL_CORE_FT
Diet: You should adhere to a low fat diet to minimize your symptoms.    Please be sure to follow up with your primary care physician in 1-2 weeks following discharge from the hospital.

## 2020-08-18 NOTE — PROGRESS NOTE ADULT - ASSESSMENT
80F with PMH significant for HTN, CAYDEN with acute cholecystitis.     - PLAN:  Poss. choledocho  - MRCP with 9mm duct and no stones  - no indication for ERCP or EUS per GI  Acute karolyn  - Booked for OR tomorrow: lap karolyn  - Pt reconsidering; may prefer non-surgical alternatives  Diet  - tolerating regular diet  - NPO at midnight for poss. OR  - Trend WBC  - IV Abx: Zosyn  - Pain control  - DVT ppx: Lovenox    Bubba Hoskins, PGY-1  B Surgery Pager #[phone]

## 2020-08-18 NOTE — DISCHARGE NOTE PROVIDER - HOSPITAL COURSE
80 year old lady with history of HTN, CAYDEN, presenting to ED c/o abdominal pain. Patient reports pain started 1 day ago upon awakening, and was associated with nausea and vomiting. Pt had 2 more episodes of vomiting throughout the day along with constant pain in the epigastric and RUQ areas, prompting her to present to the ED in the evening. Pain described as dull, 8/10 in severity, non-radiating. Pt has previously had 3 similar episodes with the first being in 2017, but was never told she had gallstones. Last BM was this morning. Denies any other complaints including recent illness/sick contacts, fevers/chills, chest pain/shortness of breath, diarrhea/constipation. Pt had CT scan on 8/17 which showed  Sonographic findings concerning for acute cholecystitis. Dilated common bile duct with mild intrahepatic biliary ductal dilatation. Cannot exclude distal choledocholithiasis as the distal common bile duct is obscured by bowel gas. Hepatomegaly.        MRCP showed Trace central intrahepatic biliary distention and a mildly prominent extra hepatic common bile duct measuring up to 0.9 cm with normal distal tapering. No choledocholithiasis. Cholelithiasis with mild gallbladder wall edema and trace pericholecystic fluid, compatible with provided history of acute cholecystitis.            ***** 80 year old lady with history of HTN, CAYDEN, presenting to ED c/o abdominal pain. Patient reports pain started 1 day ago upon awakening, and was associated with nausea and vomiting. Pt had 2 more episodes of vomiting throughout the day along with constant pain in the epigastric and RUQ areas, prompting her to present to the ED in the evening. Pain described as dull, 8/10 in severity, non-radiating. Pt has previously had 3 similar episodes with the first being in 2017, but was never told she had gallstones. Last BM was this morning. Denies any other complaints including recent illness/sick contacts, fevers/chills, chest pain/shortness of breath, diarrhea/constipation. Pt had CT scan on 8/17 which showed  Sonographic findings concerning for acute cholecystitis. Dilated common bile duct with mild intrahepatic biliary ductal dilatation. Cannot exclude distal choledocholithiasis as the distal common bile duct is obscured by bowel gas. Hepatomegaly.        MRCP showed Trace central intrahepatic biliary distention and a mildly prominent extra hepatic common bile duct measuring up to 0.9 cm with normal distal tapering. No choledocholithiasis. Cholelithiasis with mild gallbladder wall edema and trace pericholecystic fluid, compatible with provided history of acute cholecystitis.        Patient was offered surgery, however elected to defer for the time being. She clinically improved with medical management and was deemed stable to discharge home. 80 year old lady with history of HTN, CAYDEN, presenting to ED c/o abdominal pain. Patient reports pain started 1 day ago upon awakening, and was associated with nausea and vomiting. Pt had 2 more episodes of vomiting throughout the day along with constant pain in the epigastric and RUQ areas, prompting her to present to the ED in the evening. Pain described as dull, 8/10 in severity, non-radiating. Pt has previously had 3 similar episodes with the first being in 2017, but was never told she had gallstones. Last BM was this morning. Denies any other complaints including recent illness/sick contacts, fevers/chills, chest pain/shortness of breath, diarrhea/constipation. Pt had CT scan on 8/17 which showed  Sonographic findings concerning for acute cholecystitis. Dilated common bile duct with mild intrahepatic biliary ductal dilatation. Cannot exclude distal choledocholithiasis as the distal common bile duct is obscured by bowel gas. Hepatomegaly.        MRCP showed Trace central intrahepatic biliary distention and a mildly prominent extra hepatic common bile duct measuring up to 0.9 cm with normal distal tapering. No choledocholithiasis. Cholelithiasis with mild gallbladder wall edema and trace pericholecystic fluid, compatible with provided history of acute cholecystitis.        Patient was offered surgery, however elected to defer for the time being. She clinically improved with medical management and was deemed stable to discharge home.         Patient will be discharged on Augmentin for total of 10 days antibiotic treatment.  She will follow up with Dr Le in 1-2 weeks.

## 2020-08-19 LAB
ANION GAP SERPL CALC-SCNC: 11 MMO/L — SIGNIFICANT CHANGE UP (ref 7–14)
BUN SERPL-MCNC: 8 MG/DL — SIGNIFICANT CHANGE UP (ref 7–23)
CALCIUM SERPL-MCNC: 8.5 MG/DL — SIGNIFICANT CHANGE UP (ref 8.4–10.5)
CHLORIDE SERPL-SCNC: 106 MMOL/L — SIGNIFICANT CHANGE UP (ref 98–107)
CO2 SERPL-SCNC: 25 MMOL/L — SIGNIFICANT CHANGE UP (ref 22–31)
CREAT SERPL-MCNC: 0.88 MG/DL — SIGNIFICANT CHANGE UP (ref 0.5–1.3)
GLUCOSE SERPL-MCNC: 98 MG/DL — SIGNIFICANT CHANGE UP (ref 70–99)
HCT VFR BLD CALC: 40 % — SIGNIFICANT CHANGE UP (ref 34.5–45)
HGB BLD-MCNC: 11.4 G/DL — LOW (ref 11.5–15.5)
MAGNESIUM SERPL-MCNC: 2 MG/DL — SIGNIFICANT CHANGE UP (ref 1.6–2.6)
MCHC RBC-ENTMCNC: 21.8 PG — LOW (ref 27–34)
MCHC RBC-ENTMCNC: 28.5 % — LOW (ref 32–36)
MCV RBC AUTO: 76.6 FL — LOW (ref 80–100)
NRBC # FLD: 0 K/UL — SIGNIFICANT CHANGE UP (ref 0–0)
PHOSPHATE SERPL-MCNC: 3 MG/DL — SIGNIFICANT CHANGE UP (ref 2.5–4.5)
PLATELET # BLD AUTO: 304 K/UL — SIGNIFICANT CHANGE UP (ref 150–400)
PMV BLD: 10.9 FL — SIGNIFICANT CHANGE UP (ref 7–13)
POTASSIUM SERPL-MCNC: 4 MMOL/L — SIGNIFICANT CHANGE UP (ref 3.5–5.3)
POTASSIUM SERPL-SCNC: 4 MMOL/L — SIGNIFICANT CHANGE UP (ref 3.5–5.3)
RBC # BLD: 5.22 M/UL — HIGH (ref 3.8–5.2)
RBC # FLD: 15 % — HIGH (ref 10.3–14.5)
SODIUM SERPL-SCNC: 142 MMOL/L — SIGNIFICANT CHANGE UP (ref 135–145)
WBC # BLD: 8.72 K/UL — SIGNIFICANT CHANGE UP (ref 3.8–10.5)
WBC # FLD AUTO: 8.72 K/UL — SIGNIFICANT CHANGE UP (ref 3.8–10.5)

## 2020-08-19 PROCEDURE — 99231 SBSQ HOSP IP/OBS SF/LOW 25: CPT | Mod: GC

## 2020-08-19 RX ORDER — ACETAMINOPHEN 500 MG
2 TABLET ORAL
Qty: 0 | Refills: 0 | DISCHARGE
Start: 2020-08-19

## 2020-08-19 RX ORDER — PSYLLIUM SEED (WITH DEXTROSE)
1 POWDER (GRAM) ORAL
Refills: 0 | Status: DISCONTINUED | OUTPATIENT
Start: 2020-08-19 | End: 2020-08-20

## 2020-08-19 RX ADMIN — PIPERACILLIN AND TAZOBACTAM 25 GRAM(S): 4; .5 INJECTION, POWDER, LYOPHILIZED, FOR SOLUTION INTRAVENOUS at 01:13

## 2020-08-19 RX ADMIN — Medication 81 MILLIGRAM(S): at 11:53

## 2020-08-19 RX ADMIN — Medication 1 TABLET(S): at 17:22

## 2020-08-19 RX ADMIN — PIPERACILLIN AND TAZOBACTAM 25 GRAM(S): 4; .5 INJECTION, POWDER, LYOPHILIZED, FOR SOLUTION INTRAVENOUS at 11:48

## 2020-08-19 RX ADMIN — AMLODIPINE BESYLATE 5 MILLIGRAM(S): 2.5 TABLET ORAL at 06:07

## 2020-08-19 RX ADMIN — ENOXAPARIN SODIUM 40 MILLIGRAM(S): 100 INJECTION SUBCUTANEOUS at 11:53

## 2020-08-19 RX ADMIN — Medication 1 PACKET(S): at 17:22

## 2020-08-19 NOTE — PROGRESS NOTE ADULT - ATTENDING COMMENTS
I have personally interviewed and examined this patient, reviewed pertinent labs and imaging (MRCP), and discussed the case with colleagues, residents, and physician assistants on B Team rounds.    A&Ox3  anicteric  abd soft  WBC downtrending  LFTs mild increase in transaminases    The active care issues are:  1. acute calculous cholecystitis    Patient informed of the risks, benefits, alternatives and recovery from lap, possible open, CCY.  This is recurrent disease and patient leaning towards surgical management.  She will discuss with her family and let us know; tentatively scheduled for tomorrow.  NPO past midnight.  Appreciate med consult.    The Acute Care Surgery (B Team) Attending Group Practice:  Dr. Micah Le, Dr. Alexis Miller, Dr. Steve Davila, Dr. Dashawn Mitchell  urgent issues - spectra [id number] or [id number]  nonurgent issues - [phone]  patient appointments or afterhours - [phone]
tolerated home fries and spinach-eggs this morning  abd soft, very mild tenderness RUQ    resolving acute calculous cholecystitis with medical management, patient prefers to wait on decision regarding lap CCY  given loose stools (likely 2/2 antibiotics), she would like to stay in hospital one more day  this seems reasonable to avoid consequences of potential dehydration in this elderly patient

## 2020-08-19 NOTE — PROGRESS NOTE ADULT - ASSESSMENT
80F with PMH significant for HTN, CAYDEN with acute cholecystitis, does not want surgery at this time, willing to follow up as outpt for potential cholecystectomy at a later time.    - PLAN:      B team surgery   [id number] 80F with PMH significant for HTN, CAYDEN with acute cholecystitis, does not want surgery at this time, willing to follow up as outpt for potential cholecystectomy at a later time.    - Denies abdominal pain  - Passing regular BMs  - Tolerating diet  - Ambulating  - DC to home with instructions to follow up      B team surgery   [id number]

## 2020-08-20 ENCOUNTER — TRANSCRIPTION ENCOUNTER (OUTPATIENT)
Age: 80
End: 2020-08-20

## 2020-08-20 VITALS
DIASTOLIC BLOOD PRESSURE: 50 MMHG | RESPIRATION RATE: 16 BRPM | TEMPERATURE: 99 F | HEART RATE: 95 BPM | OXYGEN SATURATION: 100 % | SYSTOLIC BLOOD PRESSURE: 117 MMHG

## 2020-08-20 LAB
ANION GAP SERPL CALC-SCNC: 12 MMO/L — SIGNIFICANT CHANGE UP (ref 7–14)
BUN SERPL-MCNC: 7 MG/DL — SIGNIFICANT CHANGE UP (ref 7–23)
CALCIUM SERPL-MCNC: 9 MG/DL — SIGNIFICANT CHANGE UP (ref 8.4–10.5)
CHLORIDE SERPL-SCNC: 100 MMOL/L — SIGNIFICANT CHANGE UP (ref 98–107)
CO2 SERPL-SCNC: 24 MMOL/L — SIGNIFICANT CHANGE UP (ref 22–31)
CREAT SERPL-MCNC: 0.74 MG/DL — SIGNIFICANT CHANGE UP (ref 0.5–1.3)
GLUCOSE SERPL-MCNC: 97 MG/DL — SIGNIFICANT CHANGE UP (ref 70–99)
HCT VFR BLD CALC: 35.6 % — SIGNIFICANT CHANGE UP (ref 34.5–45)
HGB BLD-MCNC: 10.9 G/DL — LOW (ref 11.5–15.5)
MAGNESIUM SERPL-MCNC: 2 MG/DL — SIGNIFICANT CHANGE UP (ref 1.6–2.6)
MCHC RBC-ENTMCNC: 22.9 PG — LOW (ref 27–34)
MCHC RBC-ENTMCNC: 30.6 % — LOW (ref 32–36)
MCV RBC AUTO: 74.6 FL — LOW (ref 80–100)
NRBC # FLD: 0 K/UL — SIGNIFICANT CHANGE UP (ref 0–0)
PHOSPHATE SERPL-MCNC: 3.1 MG/DL — SIGNIFICANT CHANGE UP (ref 2.5–4.5)
PLATELET # BLD AUTO: 283 K/UL — SIGNIFICANT CHANGE UP (ref 150–400)
PMV BLD: 10.9 FL — SIGNIFICANT CHANGE UP (ref 7–13)
POTASSIUM SERPL-MCNC: 3.9 MMOL/L — SIGNIFICANT CHANGE UP (ref 3.5–5.3)
POTASSIUM SERPL-SCNC: 3.9 MMOL/L — SIGNIFICANT CHANGE UP (ref 3.5–5.3)
RBC # BLD: 4.77 M/UL — SIGNIFICANT CHANGE UP (ref 3.8–5.2)
RBC # FLD: 14.9 % — HIGH (ref 10.3–14.5)
SODIUM SERPL-SCNC: 136 MMOL/L — SIGNIFICANT CHANGE UP (ref 135–145)
WBC # BLD: 7.95 K/UL — SIGNIFICANT CHANGE UP (ref 3.8–10.5)
WBC # FLD AUTO: 7.95 K/UL — SIGNIFICANT CHANGE UP (ref 3.8–10.5)

## 2020-08-20 PROCEDURE — 99238 HOSP IP/OBS DSCHRG MGMT 30/<: CPT | Mod: GC

## 2020-08-20 RX ORDER — PSYLLIUM SEED (WITH DEXTROSE)
1 POWDER (GRAM) ORAL
Qty: 0 | Refills: 0 | DISCHARGE
Start: 2020-08-20

## 2020-08-20 RX ORDER — POTASSIUM CHLORIDE 20 MEQ
20 PACKET (EA) ORAL ONCE
Refills: 0 | Status: COMPLETED | OUTPATIENT
Start: 2020-08-20 | End: 2020-08-20

## 2020-08-20 RX ADMIN — Medication 20 MILLIEQUIVALENT(S): at 11:35

## 2020-08-20 RX ADMIN — Medication 81 MILLIGRAM(S): at 11:35

## 2020-08-20 RX ADMIN — ENOXAPARIN SODIUM 40 MILLIGRAM(S): 100 INJECTION SUBCUTANEOUS at 11:35

## 2020-08-20 RX ADMIN — Medication 1 TABLET(S): at 05:19

## 2020-08-20 RX ADMIN — AMLODIPINE BESYLATE 5 MILLIGRAM(S): 2.5 TABLET ORAL at 05:19

## 2020-08-20 NOTE — PROGRESS NOTE ADULT - ASSESSMENT
Patient is an 80 year old female with a PMHx of HTN and CAYDEN who presented to the ED with abdominal pain.  Patient reports pain started 1 day prior to admission and was associated with nausea and vomiting.  Abdominal ultrasound performed showed sonographic findings concerning for acute cholecystitis.      PLAN:  - Patient refusing surgery  - Lowfat diet  - Monitor bowl movements  - Augmentin x7 days total  - Out of bed  - Pain control  - VTE prophylaxis with Lovenox subcutaneous  - Discharge planning today      #[id number]  B Team Surgery Patient is an 80 year old female with a PMHx of HTN and CAYDEN who presented to the ED with abdominal pain.  Patient reports pain started 1 day prior to admission and was associated with nausea and vomiting.  Abdominal ultrasound performed showed sonographic findings concerning for acute cholecystitis.      PLAN:  - Patient refusing surgery  - Lowfat diet  - Monitor bowl movements  - Antibiotics x10 days total  - Out of bed  - Pain control  - VTE prophylaxis with Lovenox subcutaneous  - Discharge planning today      #[id number]  B Team Surgery Patient is an 80 year old female with a PMHx of HTN and CAYDEN who presented to the ED with abdominal pain.  Patient reports pain started 1 day prior to admission and was associated with nausea and vomiting.  Abdominal ultrasound performed showed sonographic findings concerning for acute cholecystitis. Doing well this morning and is amenable to discharge.      PLAN:  - Patient refusing surgery  - Lowfat diet  - Monitor bowl movements  - Antibiotics x10 days total (day 3/10)  - Out of bed  - Pain control  - VTE prophylaxis with Lovenox subcutaneous  - Discharge planning today      #[id number]  B Team Surgery

## 2020-08-20 NOTE — DISCHARGE NOTE NURSING/CASE MANAGEMENT/SOCIAL WORK - PATIENT PORTAL LINK FT
You can access the FollowMyHealth Patient Portal offered by [facility] by registering at the following website: [email or web address]. By joining [facility]’s FollowMyHealth portal, you will also be able to view your health information using other applications (apps) compatible with our system.

## 2020-08-20 NOTE — PROGRESS NOTE ADULT - SUBJECTIVE AND OBJECTIVE BOX
GENERAL SURGERY PROGRESS NOTE    SUBJECTIVE:  No acute events overnight  Pt examined at bedside  Denies pain  Denies nausea, vomiting  Is passing gas and having bowel movements. Denies diarrhea  Tolerating diet  Ambulating independently    OBJECTIVE:  Vital Signs Last 24 Hrs  T(C): 36.9 (19 Aug 2020 02:14), Max: 36.9 (18 Aug 2020 17:48)  T(F): 98.4 (19 Aug 2020 02:14), Max: 98.5 (18 Aug 2020 17:48)  HR: 84 (19 Aug 2020 02:14) (82 - 103)  BP: 117/58 (19 Aug 2020 02:14) (117/58 - 136/70)  BP(mean): --  RR: 17 (19 Aug 2020 02:14) (16 - 17)  SpO2: 98% (19 Aug 2020 02:14) (98% - 100%)    Physical Examination:  GEN: NAD, resting quietly  NEURO: AAOx3, CN II-XII grossly intact, no focal deficits  PULM: symmetric chest rise bilaterally, no increased WOB  ABD: soft, nontender, nondistended  EXTR: no LE erythema, moving all extremities  VASC: LE warm and well-perfused    LABS:                        11.7   10.44 )-----------( 317      ( 18 Aug 2020 06:30 )             40.3       08-18    140  |  100  |  5<L>  ----------------------------<  96  3.4<L>   |  26  |  0.79    Ca    9.2      18 Aug 2020 06:30  Phos  3.2     08-18  Mg     1.9     08-18    TPro  7.0  /  Alb  3.7  /  TBili  0.8  /  DBili  x   /  AST  86<H>  /  ALT  53<H>  /  AlkPhos  50  08-17
GENERAL SURGERY PROGRESS NOTE    Patient: LETA MEJÍA , 80y (40)Female   MRN: [id number]  Location: [address]  Visit: 20 Inpatient  Date: 20 @ 08:21      Events of past 24 hours:  - Admitted from ED with US findings c/w cholecystitis     PAST MEDICAL & SURGICAL HISTORY:      Vitals: T(F): 98.4 (20 @ 06:33), Max: 100.6 (20 @ 00:07)  HR: 70 (20 @ 06:33)  BP: 147/77 (20 @ 06:33)  RR: 16 (20 @ 06:33)  SpO2: 100% (20 @ 06:33)      Diet, NPO:   Except Medications        PHYSICAL EXAM  General: NAD, resting comfortably  Neurology: A&Ox3, moves all extremities  Respiratory: nonlabored breathing, normal chest wall expansion  Abdominal: Soft, tender to palpation, nondistended  Vascular: Warm and well perfused  Extremities: No edema      MEDICATIONS  (STANDING):  amLODIPine   Tablet 5 milliGRAM(s) Oral daily  aspirin  chewable 81 milliGRAM(s) Oral daily  enoxaparin Injectable 40 milliGRAM(s) SubCutaneous daily  lactated ringers. 1000 milliLiter(s) (125 mL/Hr) IV Continuous <Continuous>  piperacillin/tazobactam IVPB.. 3.375 Gram(s) IV Intermittent every 8 hours    MEDICATIONS  (PRN):  acetaminophen   Tablet .. 650 milliGRAM(s) Oral every 6 hours PRN Mild Pain (1 - 3)  ondansetron Injectable 4 milliGRAM(s) IV Push every 6 hours PRN Nausea and/or Vomiting  oxyCODONE    IR 2.5 milliGRAM(s) Oral every 4 hours PRN Moderate Pain (4 - 6)  oxyCODONE    IR 5 milliGRAM(s) Oral every 4 hours PRN Severe Pain (7 - 10)      LAB/STUDIES:  CAPILLARY BLOOD GLUCOSE      POCT Blood Glucose.: 127 mg/dL (16 Aug 2020 23:38)                          12.8   13.71 )-----------( 352      ( 16 Aug 2020 23:40 )             41.5     08-16    137  |  97<L>  |  7   ----------------------------<  132<H>  3.9   |  24  |  0.69    Ca    10.1      16 Aug 2020 23:40    TPro  7.8  /  Alb  4.5  /  TBili  0.6  /  DBili  x   /  AST  20  /  ALT  11  /  AlkPhos  47  08-16               7.8  | 0.6  | 20       ------------------[47      ( 16 Aug 2020 23:40 )  4.5  | x    | 11          Lipase:35.1   Amylase:x          Urinalysis Basic - ( 16 Aug 2020 22:30 )    Color: COLORLESS / Appearance: CLEAR / S.004 / pH: 6.5  Gluc: NEGATIVE / Ketone: NEGATIVE  / Bili: NEGATIVE / Urobili: NORMAL   Blood: NEGATIVE / Protein: NEGATIVE / Nitrite: NEGATIVE   Leuk Esterase: NEGATIVE / RBC: x / WBC x   Sq Epi: x / Non Sq Epi: x / Bacteria: x      IMAGING:  < from: US Abdomen Limited (20 @ 00:54) >  FINDINGS:    Liver: Liver measures 18.5 cm with normal contour, texture and echogenicity. Hepatopedal flow in the main portal vein.  Bile ducts: Extrahepatic and mild intrahepatic biliary ductal dilatation. Common bile duct measures 10 mm. Distal common bile duct is obscured by bowel gas.  Gallbladder: Cholelithiasis and sludge with pericholecystic fluid and positive sonographic Ortega sign. Gallbladder wall is borderline thickened measuring 3 mm.  Pancreas: Visualized portions are within normal limits.  Right kidney: 11.0 cm. No hydronephrosis. Right renal cyst measures 3 cm  Ascites: None.  IVC: Visualized portions are within normal limits.    IMPRESSION:    Sonographic findings concerning for acute cholecystitis.    Dilated common bile duct with mildintrahepatic biliary ductal dilatation. Cannot exclude distal choledocholithiasis as the distal common bile duct is obscured by bowel gas.    Hepatomegaly.        < end of copied text >
Surgery Daily Progress Note  =====================================================  Interval / Overnight Events: No episodes of diarrhea overnight.      HPI:  Patient is an 80 year old female with a PMHx of HTN and CAYDEN who presented to the ED with abdominal pain.  Patient reports pain started 1 day prior to admission and was associated with nausea and vomiting.  She had 2 more episodes of vomiting throughout the day along with constant pain in the epigastric and right upper quadrant areas, prompting her to present to the ED in the evening.  Pain described as dull, 8/10 in severity, non-radiating.  Patient has previously had 3 similar episodes with the first being in 2017, but was never told she had gallstones. (17 Aug 2020 02:43)    --------------------------------------------------------------------------------------------     PAST MEDICAL & SURGICAL HISTORY:  Hypertension  Status post total abdominal hysterectomy      ALLERGIES:  No Known Allergies    --------------------------------------------------------------------------------------    MEDICATIONS:    Neurologic Medications  acetaminophen   Tablet .. 650 milliGRAM(s) Oral every 6 hours PRN Mild Pain (1 - 3)  ondansetron Injectable 4 milliGRAM(s) IV Push every 6 hours PRN Nausea and/or Vomiting  oxyCODONE    IR 2.5 milliGRAM(s) Oral every 4 hours PRN Moderate Pain (4 - 6)  oxyCODONE    IR 5 milliGRAM(s) Oral every 4 hours PRN Severe Pain (7 - 10)    Cardiovascular Medications  amLODIPine   Tablet 5 milliGRAM(s) Oral daily    Gastrointestinal Medications  psyllium Powder 1 Packet(s) Oral two times a day    Hematologic/Oncologic Medications  aspirin  chewable 81 milliGRAM(s) Oral daily  enoxaparin Injectable 40 milliGRAM(s) SubCutaneous daily    Antimicrobial/Immunologic Medications  amoxicillin  500 milliGRAM(s)/clavulanate 1 Tablet(s) Oral every 12 hours    --------------------------------------------------------------------------------------    VITAL SIGNS:  T(C): 37 (20 Aug 2020 05:24), Max: 37.3 (19 Aug 2020 09:30)  T(F): 98.6 (20 Aug 2020 05:24), Max: 99.1 (19 Aug 2020 09:30)  HR: 84 (20 Aug 2020 05:24) (78 - 89)  BP: 119/50 (20 Aug 2020 05:24) (114/53 - 132/61)  RR: 16 (20 Aug 2020 05:24) (16 - 19)  SpO2: 100% (20 Aug 2020 05:24) (97% - 100%)    --------------------------------------------------------------------------------------    INS AND OUTS:    19 Aug 2020 07:01  -  20 Aug 2020 07:00  --------------------------------------------------------  IN:    Oral Fluid: 240 mL  Total IN: 240 mL    OUT:    Voided: 450 mL  Total OUT: 450 mL    Total NET: -210 mL    --------------------------------------------------------------------------------------    EXAM    NEUROLOGY  Exam: Normal, in no acute distress.  Alert and oriented x4.  No focal neurologic deficits.    HEENT  Exam: Normocephalic, atraumatic.    RESPIRATORY  Exam: Normal expansion / effort.    CARDIOVASCULAR  Exam: S1, S2.  Regular rate and rhythm.    GI/NUTRITION  Exam: Abdomen soft, Non-tender, Non-distended.  Current Diet: Lowfat diet    MUSCULOSKELETAL  Exam: All extremities moving spontaneously without limitations.      HEMATOLOGIC  [x] VTE Prophylaxis: aspirin  chewable 81 milliGRAM(s) Oral daily  enoxaparin Injectable 40 milliGRAM(s) SubCutaneous daily      INFECTIOUS DISEASE  Antimicrobials/Immunologic Medications:  amoxicillin  500 milliGRAM(s)/clavulanate 1 Tablet(s) Oral every 12 hours    --------------------------------------------------------------------------------------    LABS    CBC:                      11.4   8.72  )-----------( 304      ( 19 Aug 2020 06:35 )             40.0     CHEM:  142  |  106  |  8   ----------------------------<  98  4.0   |  25  |  0.88    Ca    8.5      19 Aug 2020 06:35  Phos  3.0     08-19  Mg     2.0     08-19    --------------------------------------------------------------------------------------
GENERAL SURGERY PROGRESS NOTE    SUBJECTIVE:  No acute events overnight  Pt examined at bedside  Reports pain  Denies nausea, vomiting  Is passing gas and having bowel movements. Denies diarrhea  Tolerating diet  Ambulating independently    OBJECTIVE:  Vital Signs Last 24 Hrs  T(C): 36.8 (18 Aug 2020 10:20), Max: 37.1 (17 Aug 2020 22:08)  T(F): 98.2 (18 Aug 2020 10:20), Max: 98.8 (18 Aug 2020 02:50)  HR: 82 (18 Aug 2020 10:20) (68 - 82)  BP: 136/70 (18 Aug 2020 10:20) (118/56 - 140/62)  BP(mean): --  RR: 17 (18 Aug 2020 10:20) (16 - 17)  SpO2: 100% (18 Aug 2020 10:20) (99% - 100%)    Physical Examination:  GEN: NAD, resting quietly  NEURO: AAOx3, CN II-XII grossly intact, no focal deficits  PULM: symmetric chest rise bilaterally, no increased WOB  ABD: soft, nontender, nondistended  EXTR: no LE erythema, moving all extremities  VASC: LE warm and well-perfused    LABS:                        11.7   10.44 )-----------( 317      ( 18 Aug 2020 06:30 )             40.3       08-18    140  |  100  |  5<L>  ----------------------------<  96  3.4<L>   |  26  |  0.79    Ca    9.2      18 Aug 2020 06:30  Phos  3.2     08-18  Mg     1.9     08-18    TPro  7.0  /  Alb  3.7  /  TBili  0.8  /  DBili  x   /  AST  86<H>  /  ALT  53<H>  /  AlkPhos  50  08-17

## 2020-08-24 LAB
SARS-COV-2 IGG SERPL QL IA: NEGATIVE — SIGNIFICANT CHANGE UP
SARS-COV-2 IGM SERPL IA-ACNC: 0.09 INDEX — SIGNIFICANT CHANGE UP

## 2020-09-09 ENCOUNTER — APPOINTMENT (OUTPATIENT)
Dept: SURGERY | Facility: CLINIC | Age: 80
End: 2020-09-09
Payer: MEDICARE

## 2020-09-09 VITALS
TEMPERATURE: 98.2 F | HEIGHT: 61 IN | HEART RATE: 90 BPM | BODY MASS INDEX: 31.15 KG/M2 | DIASTOLIC BLOOD PRESSURE: 81 MMHG | SYSTOLIC BLOOD PRESSURE: 135 MMHG | WEIGHT: 165 LBS

## 2020-09-09 PROBLEM — I10 ESSENTIAL (PRIMARY) HYPERTENSION: Chronic | Status: ACTIVE | Noted: 2020-08-17

## 2020-09-09 PROBLEM — Z00.00 ENCOUNTER FOR PREVENTIVE HEALTH EXAMINATION: Status: ACTIVE | Noted: 2020-09-09

## 2020-09-09 PROCEDURE — 99213 OFFICE O/P EST LOW 20 MIN: CPT

## 2020-09-09 NOTE — PLAN
[FreeTextEntry1] : S/p antibiotic course for acute cholecystitis. Mild intra/extrahepatic dilation. I advised her that she could suffer acute cholecystitis again and we would recommend antibiotics again and possible IR drainage vs cholecystectomy. She understands this risk. She feels comfortable without interval cholecystectomy. \par \par I spent 15min reviewing data, images and information. Greater than 50% of my time was spent in face to face discussion regarding wound healing, postoperative diet and activity.\par \par Steve Davila MD\par Acute Care Surgery\par

## 2020-09-09 NOTE — HISTORY OF PRESENT ILLNESS
[de-identified] : Mrs. Holt has successfully been treated nonoperatively for acute cholecystitis. She finished her antibiotics a few days ago an has not had any pain. No jaundicing or fever/chills.  Eating well and avoiding fatty food.

## 2020-09-09 NOTE — PHYSICAL EXAM
[de-identified] : Well ,comfortable. [de-identified] : No scleral icterus  [de-identified] : NT in RUQ

## 2020-09-10 ENCOUNTER — APPOINTMENT (OUTPATIENT)
Dept: SURGERY | Facility: CLINIC | Age: 80
End: 2020-09-10

## 2021-06-15 ENCOUNTER — APPOINTMENT (OUTPATIENT)
Dept: ORTHOPEDIC SURGERY | Facility: CLINIC | Age: 81
End: 2021-06-15
Payer: MEDICARE

## 2021-06-15 VITALS
BODY MASS INDEX: 31.15 KG/M2 | OXYGEN SATURATION: 98 % | HEART RATE: 98 BPM | HEIGHT: 61 IN | DIASTOLIC BLOOD PRESSURE: 82 MMHG | SYSTOLIC BLOOD PRESSURE: 143 MMHG | WEIGHT: 165 LBS

## 2021-06-15 DIAGNOSIS — M25.562 PAIN IN LEFT KNEE: ICD-10-CM

## 2021-06-15 PROCEDURE — 99072 ADDL SUPL MATRL&STAF TM PHE: CPT

## 2021-06-15 PROCEDURE — 73564 X-RAY EXAM KNEE 4 OR MORE: CPT | Mod: LT

## 2021-06-15 PROCEDURE — 99204 OFFICE O/P NEW MOD 45 MIN: CPT

## 2021-06-15 RX ORDER — AMLODIPINE BESYLATE 5 MG/1
5 TABLET ORAL
Refills: 0 | Status: ACTIVE | COMMUNITY

## 2021-06-15 RX ORDER — ASPIRIN 81 MG
81 TABLET, DELAYED RELEASE (ENTERIC COATED) ORAL
Refills: 0 | Status: ACTIVE | COMMUNITY

## 2021-06-15 NOTE — DISCUSSION/SUMMARY
[de-identified] : The patient and her daughter were informed of the findings.  They were shown the x-rays.  Patient was advised she would be a candidate for total knee arthroplasty given the degree of arthritis and current clinical and radiographic findings.  Patient is not interested in proceeding with surgery at this time.  She declined referral to physical therapy she also declined a brace however was prescribed a walker if needed.  In the interim she will be prescribed meloxicam and was given full instructions on precautions with this medication.  Lastly the patient should undergo viscosupplementation and authorization should be obtained to proceed with Euflexxa to the left knee accordingly

## 2021-06-15 NOTE — PHYSICAL EXAM
[de-identified] : Examination of the left knee discloses mild effusion with varus deformity medial joint line tenderness with crepitus.  Motion is limited to 110 degrees flexion with terminal tenderness [de-identified] : X-rays taken of the left knee and AP lateral skyline and open notch views disclose end-stage bone-on-bone medial joint space narrowing tricompartmental arthritis

## 2021-06-15 NOTE — HISTORY OF PRESENT ILLNESS
[Worsening] : worsening [___ yrs] : [unfilled] year(s) ago [2] : a maximum pain level of 2/10 [Intermit.] : ~He/She~ states the symptoms seem to be intermittent [Walking] : worsened by walking [Knee Flexion] : worsened with knee flexion [Rest] : relieved by rest [de-identified] : Pt presents for initial evaluation with pain in her left knee, there is no known injury. Pt is not taking any pain medication. Pt is concerned because her knee has been buckling. She has not taking any pain medication, there is no clicking. Pt has hx of back discomfort, there  is no numbness or tingling radiating to the  lower extremities. [de-identified] : certain movements, buckling

## 2021-06-20 ENCOUNTER — FORM ENCOUNTER (OUTPATIENT)
Age: 81
End: 2021-06-20

## 2021-07-12 ENCOUNTER — APPOINTMENT (OUTPATIENT)
Dept: ORTHOPEDIC SURGERY | Facility: CLINIC | Age: 81
End: 2021-07-12
Payer: MEDICARE

## 2021-07-12 VITALS
HEIGHT: 61 IN | DIASTOLIC BLOOD PRESSURE: 82 MMHG | OXYGEN SATURATION: 98 % | BODY MASS INDEX: 32.47 KG/M2 | WEIGHT: 172 LBS | HEART RATE: 82 BPM | SYSTOLIC BLOOD PRESSURE: 127 MMHG

## 2021-07-12 PROCEDURE — 99072 ADDL SUPL MATRL&STAF TM PHE: CPT

## 2021-07-12 PROCEDURE — 20611 DRAIN/INJ JOINT/BURSA W/US: CPT | Mod: LT

## 2021-07-12 NOTE — PROCEDURE
[de-identified] : Under sterile technique using ultrasound-guided needle placement the left knee was injected with the first of 3 Orthovisc injections.\par \par A discussion took place with the patient regarding the procedure. General risks and benefits were reviewed.\par The suprapatellar region of the knee was prepped to attain a sterile field. Ethyl Chloride spray was used as a topical anesthetic. \par A 22-gauge 1-1/2 inch needle was used to inject the medication.\par The procedure was performed utilizing ultrasound guided needle placement with the Sonosite linear transducer in order to visualize and confirm the location of the needle tip and intra-articular delivery of the medication in the exact location desired to achieve maximal benefit from the medication. The images were recorded and saved.\par The injection site was sterilely dressed and the patient tolerated the procedure well, without complications.\par The patient was given post injection instructions including rest, cool pack applications, and take NSAIDs or acetaminophen. The patient was advised that if there are worsening symptoms or any associated problems, to contact our office accordingly

## 2021-07-12 NOTE — HISTORY OF PRESENT ILLNESS
[Worsening] : worsening [___ yrs] : [unfilled] year(s) ago [2] : a maximum pain level of 2/10 [Walking] : walking [Intermit.] : ~He/She~ states the symptoms seem to be intermittent [Knee Flexion] : worsened with knee flexion [Rest] : relieved by rest [de-identified] : Pt returns for her left knee pain, pt is receiving her first first ORTHO VISC injection NDC # [address]-80 product code # [id number] is ambulating with a cane. [de-identified] : certain movements

## 2021-07-12 NOTE — PHYSICAL EXAM
[de-identified] : Physical examination of the left knee discloses medial sided joint line tenderness to palpation.  Mild effusion is present

## 2021-07-12 NOTE — DISCUSSION/SUMMARY
[de-identified] : The patient will return in 1 week for her second of 3 Orthovisc injections to the left knee

## 2021-07-19 ENCOUNTER — APPOINTMENT (OUTPATIENT)
Dept: ORTHOPEDIC SURGERY | Facility: CLINIC | Age: 81
End: 2021-07-19
Payer: MEDICARE

## 2021-07-19 PROCEDURE — 20611 DRAIN/INJ JOINT/BURSA W/US: CPT | Mod: LT

## 2021-07-19 PROCEDURE — 99072 ADDL SUPL MATRL&STAF TM PHE: CPT

## 2021-07-19 NOTE — PROCEDURE
[de-identified] : Pt returns for follow up for her left knee pain, pt is receiving her second ORTHOVISC #NDC [address]-24 product code #[id number] to the left knee\par \par A discussion took place with the patient regarding the procedure. General risks and benefits were reviewed.\par The suprapatellar region of the knee was prepped to attain a sterile field. Ethyl Chloride spray was used as a topical anesthetic. \par A 22-gauge 1-1/2 inch needle was used to inject the medication.\par The procedure was performed utilizing ultrasound guided needle placement with the SonGlad to Have Youte linear transducer in order to visualize and confirm the location of the needle tip and intra-articular delivery of the medication in the exact location desired to achieve maximal benefit from the medication. The images were recorded and saved.\par The injection site was sterilely dressed and the patient tolerated the procedure well, without complications.\par The patient was given post injection instructions including rest, cool pack applications, and take NSAIDs or acetaminophen. The patient was advised that if there are worsening symptoms or any associated problems, to contact our office accordingly

## 2021-07-26 ENCOUNTER — APPOINTMENT (OUTPATIENT)
Dept: ORTHOPEDIC SURGERY | Facility: CLINIC | Age: 81
End: 2021-07-26
Payer: MEDICARE

## 2021-07-26 DIAGNOSIS — M17.12 UNILATERAL PRIMARY OSTEOARTHRITIS, LEFT KNEE: ICD-10-CM

## 2021-07-26 PROCEDURE — 99072 ADDL SUPL MATRL&STAF TM PHE: CPT

## 2021-07-26 PROCEDURE — 20611 DRAIN/INJ JOINT/BURSA W/US: CPT | Mod: LT

## 2021-07-26 NOTE — PROCEDURE
[de-identified] : Pt returns for follow up for her left knee pain, pt will be receiving her third ORTHO VISC injection  #NDC [address]-94 to the left knee\par \par A discussion took place with the patient regarding the procedure. General risks and benefits were reviewed.\par The suprapatellar region of the knee was prepped to attain a sterile field. Ethyl Chloride spray was used as a topical anesthetic. \par A 22-gauge 1-1/2 inch needle was used to inject the medication.\par The procedure was performed utilizing ultrasound guided needle placement with the Sonmydalate linear transducer in order to visualize and confirm the location of the needle tip and intra-articular delivery of the medication in the exact location desired to achieve maximal benefit from the medication. The images were recorded and saved.\par The injection site was sterilely dressed and the patient tolerated the procedure well, without complications.\par The patient was given post injection instructions including rest, cool pack applications, and take NSAIDs or acetaminophen. The patient was advised that if there are worsening symptoms or any associated problems, to contact our office accordingly

## 2022-04-05 ENCOUNTER — APPOINTMENT (OUTPATIENT)
Dept: ORTHOPEDIC SURGERY | Facility: CLINIC | Age: 82
End: 2022-04-05
Payer: MEDICARE

## 2022-04-05 VITALS — HEIGHT: 61 IN | BODY MASS INDEX: 32.47 KG/M2 | WEIGHT: 172 LBS

## 2022-04-05 DIAGNOSIS — Z86.79 PERSONAL HISTORY OF OTHER DISEASES OF THE CIRCULATORY SYSTEM: ICD-10-CM

## 2022-04-05 DIAGNOSIS — M17.12 UNILATERAL PRIMARY OSTEOARTHRITIS, LEFT KNEE: ICD-10-CM

## 2022-04-05 PROCEDURE — 99204 OFFICE O/P NEW MOD 45 MIN: CPT | Mod: 25

## 2022-04-05 PROCEDURE — 73564 X-RAY EXAM KNEE 4 OR MORE: CPT | Mod: LT

## 2022-04-05 PROCEDURE — 20610 DRAIN/INJ JOINT/BURSA W/O US: CPT | Mod: RT

## 2022-04-05 NOTE — HISTORY OF PRESENT ILLNESS
[Left Leg] : left leg [Gradual] : gradual [1] : 2 [0] : 0 [Dull/Aching] : dull/aching [Localized] : localized [Intermittent] : intermittent [Walking] : walking [Retired] : Work status: retired [de-identified] : RT KNEE PAIN FOR 3+ YEARS. [] : no [FreeTextEntry1] : Lt knee [FreeTextEntry3] : few months ago [FreeTextEntry5] : patient is here with Lt knee pain, pt complains of feelings of buckling when walking

## 2022-04-05 NOTE — PHYSICAL EXAM
[Left] : left knee [4___] : hamstring 4[unfilled]/5 [All Views] : anteroposterior, lateral, skyline, and anteroposterior standing [Degenerative change] : Degenerative change [Advanced patellofemoral OA] : Advanced patellofemoral OA [] : no ecchymosis [TWNoteComboBox7] : flexion 135 degrees [de-identified] : extension 0 degrees

## 2022-04-05 NOTE — PROCEDURE
[Large Joint Injection] : Large joint injection [Right] : of the right [Knee] : knee [___ cc    2%] : Lidocaine ~Vcc of 2%  [___ cc    80mg] : Methylprednisolone (Depomedrol) ~Vcc of 80 mg  [] : Patient tolerated procedure well [Call if redness, pain or fever occur] : call if redness, pain or fever occur [Apply ice for 15min out of every hour for the next 12-24 hours as tolerated] : apply ice for 15 minutes out of every hour for the next 12-24 hours as tolerated [Patient was advised to rest the joint(s) for ____ days] : patient was advised to rest the joint(s) for [unfilled] days [Previous OTC use and PT nontherapeutic] : patient has tried OTC's including aspirin, Ibuprofen, Aleve, etc or prescription NSAIDS, and/or exercises at home and/or physical therapy without satisfactory response [Patient had decreased mobility in the joint] : patient had decreased mobility in the joint [Risks, benefits, alternatives discussed / Verbal consent obtained] : the risks benefits, and alternatives have been discussed, and verbal consent was obtained

## 2022-04-05 NOTE — ASSESSMENT
[FreeTextEntry1] : 80 YO F WITH MILD LT KNEE PAIN FOR MORE THAN 3 YEARS.  FAILED PRIOR TREATMENT OPTIONS; INJECTIONS, PT AND HEP. "THE INJECTION WAS NOT DONE CORRECTLY BECAUSE MY KNEE WAS SWOLLEN AFTER."  PAIN WORSENS WITH STAIRS AND WALKING PROLONGED DISTANCES. PAIN IS AFFECTING ADL AND FUNCTIONAL ACTIVITIES. THERE ARE BUCKLING EPISODES WHICH IS THE MAJOR ISSUE.  XRAYS REVIEWED WITH MODERATE PF OA. TREATMENT OPTIONS REVIEWED. RT KNEE CSI TODAY. \par \par PMHX: HTN. \par NO METAL ALLERGIES. NO DVT/PE.

## 2022-04-06 RX ORDER — MELOXICAM 15 MG/1
15 TABLET ORAL
Qty: 30 | Refills: 1 | Status: ACTIVE | COMMUNITY
Start: 2021-06-15 | End: 1900-01-01

## 2022-04-06 RX ORDER — HYALURONATE SODIUM 20 MG/2 ML
20 SYRINGE (ML) INTRAARTICULAR
Qty: 1 | Refills: 0 | Status: ACTIVE | OUTPATIENT
Start: 2021-06-15

## 2022-04-07 PROBLEM — Z00.00 ENCOUNTER FOR PREVENTIVE HEALTH EXAMINATION: Noted: 2022-04-07

## 2022-04-19 ENCOUNTER — APPOINTMENT (OUTPATIENT)
Dept: ORTHOPEDIC SURGERY | Facility: CLINIC | Age: 82
End: 2022-04-19

## 2022-07-07 ENCOUNTER — APPOINTMENT (OUTPATIENT)
Dept: ORTHOPEDIC SURGERY | Facility: CLINIC | Age: 82
End: 2022-07-07

## 2022-07-07 VITALS — BODY MASS INDEX: 32.47 KG/M2 | WEIGHT: 172 LBS | HEIGHT: 61 IN

## 2022-07-07 PROCEDURE — 20610 DRAIN/INJ JOINT/BURSA W/O US: CPT

## 2022-07-07 PROCEDURE — 99214 OFFICE O/P EST MOD 30 MIN: CPT | Mod: 25

## 2022-07-07 PROCEDURE — J3490M: CUSTOM

## 2022-07-07 NOTE — HISTORY OF PRESENT ILLNESS
[Left Leg] : left leg [Gradual] : gradual [1] : 2 [0] : 0 [Dull/Aching] : dull/aching [Localized] : localized [Intermittent] : intermittent [Walking] : walking [Retired] : Work status: retired [] : no [FreeTextEntry1] : Lt knee [FreeTextEntry3] : few months ago [FreeTextEntry5] : patient is here with Lt knee pain, pt complains of feelings of buckling when walking [de-identified] : RT KNEE PAIN FOR 3+ YEARS.

## 2022-07-07 NOTE — ASSESSMENT
[FreeTextEntry1] : 82 YO F WITH MILD LT KNEE INSTABILITY 3 YEARS.  FAILED PRIOR TREATMENT OPTIONS; INJECTIONS, PT AND HEP. "THE INJECTION WAS NOT DONE CORRECTLY BECAUSE MY KNEE WAS SWOLLEN AFTER."  PAIN WORSENS WITH STAIRS AND WALKING PROLONGED DISTANCES. PAIN IS AFFECTING ADL AND FUNCTIONAL ACTIVITIES. THERE ARE BUCKLING EPISODES WHICH IS THE MAJOR ISSUE.  XRAYS REVIEWED WITH MODERATE PF OA. TREATMENT OPTIONS REVIEWED. HKB LT KNEE. CSI TODAY. WE DISCUSSED VISCO. WILL GET AUTH FOR [id number]

## 2022-07-07 NOTE — PHYSICAL EXAM
[Left] : left knee [4___] : hamstring 4[unfilled]/5 [All Views] : anteroposterior, lateral, skyline, and anteroposterior standing [Degenerative change] : Degenerative change [Advanced patellofemoral OA] : Advanced patellofemoral OA [] : no ecchymosis [TWNoteComboBox7] : flexion 135 degrees [de-identified] : extension 0 degrees

## 2022-07-07 NOTE — PROCEDURE
[de-identified] : Procedure Name: Large Joint Injection / Aspiration: Depomedrol and Marcaine\par Anesthesia: ethyl chloride sprayed topically.. \par Depomedrol: An injection of Depomedrol 80 mg , 1 cc. \par Marcaine: 5 cc. \par Medication was injected in the left knee. Patient has tried OTC's including aspirin, Ibuprofen, Aleve etc or prescription\par NSAIDS, and/or exercises at home and/ or physical therapy without satisfactory response. After verbal consent using sterile preparation and technique. The risks, benefits, and alternatives to cortisone injection were explained in full to the patient. Risks outlined include but are not limited to infection, sepsis, bleeding, scarring, skin discoloration, temporary  increase in pain, syncopal episode, failure to resolve symptoms, allergic reaction, symptom recurrence, and elevation of blood sugar in diabetics. Patient understood the risks. All questions were answered. After discussion of options, patient requested an injection. Oral informed consent was obtained and sterile prep was done of the injection\par site. Sterile technique was utilized for the procedure including the preparation of the solutions used for the injection. Patient tolerated the procedure well. Advised to ice the injection site this evening. Prep with alcohol locally to site. Sterile technique used. Post Procedure Instructions: Patient was advised to call if redness, pain, or fever occur and\par apply ice for 15 min. out of every hour for the next 12-24 hours as tolerated.

## 2022-07-12 RX ORDER — HYALURONATE SODIUM 30 MG/2 ML
30 SYRINGE (ML) INTRAARTICULAR
Qty: 4 | Refills: 0 | Status: ACTIVE | COMMUNITY
Start: 2022-07-12 | End: 1900-01-01

## 2022-08-04 ENCOUNTER — APPOINTMENT (OUTPATIENT)
Dept: ORTHOPEDIC SURGERY | Facility: CLINIC | Age: 82
End: 2022-08-04

## 2022-08-04 PROCEDURE — 99213 OFFICE O/P EST LOW 20 MIN: CPT

## 2022-08-04 NOTE — PROCEDURE
[de-identified] : Procedure Name: Large Joint Injection / Aspiration: Depomedrol and Marcaine\par Anesthesia: ethyl chloride sprayed topically.. \par Depomedrol: An injection of Depomedrol 80 mg , 1 cc. \par Marcaine: 5 cc. \par Medication was injected in the left knee. Patient has tried OTC's including aspirin, Ibuprofen, Aleve etc or prescription\par NSAIDS, and/or exercises at home and/ or physical therapy without satisfactory response. After verbal consent using sterile preparation and technique. The risks, benefits, and alternatives to cortisone injection were explained in full to the patient. Risks outlined include but are not limited to infection, sepsis, bleeding, scarring, skin discoloration, temporary  increase in pain, syncopal episode, failure to resolve symptoms, allergic reaction, symptom recurrence, and elevation of blood sugar in diabetics. Patient understood the risks. All questions were answered. After discussion of options, patient requested an injection. Oral informed consent was obtained and sterile prep was done of the injection\par site. Sterile technique was utilized for the procedure including the preparation of the solutions used for the injection. Patient tolerated the procedure well. Advised to ice the injection site this evening. Prep with alcohol locally to site. Sterile technique used. Post Procedure Instructions: Patient was advised to call if redness, pain, or fever occur and\par apply ice for 15 min. out of every hour for the next 12-24 hours as tolerated.

## 2022-08-04 NOTE — ASSESSMENT
[FreeTextEntry1] : 80 YO F WITH MILD LT KNEE INSTABILITY 3 YEARS.  FAILED PRIOR TREATMENT OPTIONS; INJECTIONS, PT AND HEP. "THE INJECTION WAS NOT DONE CORRECTLY BECAUSE MY KNEE WAS SWOLLEN AFTER."  PAIN WORSENS WITH STAIRS AND WALKING PROLONGED DISTANCES. PAIN IS AFFECTING ADL AND FUNCTIONAL ACTIVITIES. THERE ARE BUCKLING EPISODES WHICH IS THE MAJOR ISSUE.  XRAYS REVIEWED WITH MODERATE PF OA. TREATMENT OPTIONS REVIEWED. HKB LT KNEE. HAD GOOD RESULTS WITH CSI IN THE PAST. WILL FOLLOW UP PRN FOR REPEAT CSI.

## 2022-08-04 NOTE — HISTORY OF PRESENT ILLNESS
[Left Leg] : left leg [Gradual] : gradual [1] : 2 [0] : 0 [Dull/Aching] : dull/aching [Localized] : localized [Intermittent] : intermittent [Walking] : walking [Retired] : Work status: retired [] : no [FreeTextEntry1] : Lt knee [FreeTextEntry3] : few months ago [FreeTextEntry5] : patient is here with Lt knee pain, pt complains of feelings of buckling when walking [de-identified] : RT KNEE PAIN FOR 3+ YEARS.

## 2022-08-04 NOTE — PHYSICAL EXAM
[Left] : left knee [4___] : hamstring 4[unfilled]/5 [All Views] : anteroposterior, lateral, skyline, and anteroposterior standing [Degenerative change] : Degenerative change [Advanced patellofemoral OA] : Advanced patellofemoral OA [] : no ecchymosis [TWNoteComboBox7] : flexion 135 degrees [de-identified] : extension 0 degrees

## 2022-10-06 ENCOUNTER — APPOINTMENT (OUTPATIENT)
Dept: ORTHOPEDIC SURGERY | Facility: CLINIC | Age: 82
End: 2022-10-06

## 2022-10-13 ENCOUNTER — APPOINTMENT (OUTPATIENT)
Dept: ORTHOPEDIC SURGERY | Facility: CLINIC | Age: 82
End: 2022-10-13

## 2022-10-13 VITALS — BODY MASS INDEX: 32.47 KG/M2 | WEIGHT: 172 LBS | HEIGHT: 61 IN

## 2022-10-13 DIAGNOSIS — M17.12 UNILATERAL PRIMARY OSTEOARTHRITIS, LEFT KNEE: ICD-10-CM

## 2022-10-13 PROCEDURE — 99214 OFFICE O/P EST MOD 30 MIN: CPT | Mod: 25

## 2022-10-13 PROCEDURE — 20610 DRAIN/INJ JOINT/BURSA W/O US: CPT

## 2022-10-13 PROCEDURE — J3490M: CUSTOM

## 2022-10-13 NOTE — ASSESSMENT
[FreeTextEntry1] : 82 YO F WITH MILD LT KNEE INSTABILITY 3 YEARS.  FAILED PRIOR TREATMENT OPTIONS; INJECTIONS, PT AND HEP. "THE INJECTION WAS NOT DONE CORRECTLY BECAUSE MY KNEE WAS SWOLLEN AFTER."  PAIN WORSENS WITH STAIRS AND WALKING PROLONGED DISTANCES. PAIN IS AFFECTING ADL AND FUNCTIONAL ACTIVITIES. THERE ARE BUCKLING EPISODES WHICH IS THE MAJOR ISSUE.  XRAYS REVIEWED WITH MODERATE PF OA. TREATMENT OPTIONS REVIEWED. HAD GOOD RESULTS WITH CSI IN THE PAST. \par \par LT KNEE CSI TODAY. PATIENT TOLERATED INJECTION WELL.

## 2022-10-13 NOTE — PROCEDURE
[de-identified] : Procedure Name: Large Joint Injection / Aspiration: Depomedrol and Marcaine Anesthesia: ethyl chloride sprayed topically..  Depomedrol: An injection of Depomedrol 80 mg , 1 cc.  Marcaine: 5 cc.  Medication was injected in the left knee. Patient has tried OTC's including aspirin, Ibuprofen, Aleve etc or prescription NSAIDS, and/or exercises at home and/ or physical therapy without satisfactory response. After verbal consent using sterile preparation and technique. The risks, benefits, and alternatives to cortisone injection were explained in full to the patient. Risks outlined include but are not limited to infection, sepsis, bleeding, scarring, skin discoloration, temporary  increase in pain, syncopal episode, failure to resolve symptoms, allergic reaction, symptom recurrence, and elevation of blood sugar in diabetics. Patient understood the risks. All questions were answered. After discussion of options, patient requested an injection. Oral informed consent was obtained and sterile prep was done of the injection site. Sterile technique was utilized for the procedure including the preparation of the solutions used for the injection. Patient tolerated the procedure well. Advised to ice the injection site this evening. Prep with alcohol locally to site. Sterile technique used. Post Procedure Instructions: Patient was advised to call if redness, pain, or fever occur and apply ice for 15 min. out of every hour for the next 12-24 hours as tolerated.

## 2022-10-13 NOTE — PHYSICAL EXAM
[Left] : left knee [4___] : hamstring 4[unfilled]/5 [] : ambulation with cane [TWNoteComboBox7] : flexion 135 degrees [de-identified] : extension 0 degrees

## 2022-10-13 NOTE — HISTORY OF PRESENT ILLNESS
[Left Leg] : left leg [Gradual] : gradual [1] : 2 [0] : 0 [Dull/Aching] : dull/aching [Localized] : localized [Intermittent] : intermittent [Walking] : walking [Retired] : Work status: retired [] : Post Surgical Visit: no [FreeTextEntry1] : Lt knee [FreeTextEntry3] : few months ago [FreeTextEntry5] : patient is here with Lt knee pain, pt complains of feelings of buckling when walking

## 2023-01-05 ENCOUNTER — APPOINTMENT (OUTPATIENT)
Dept: ORTHOPEDIC SURGERY | Facility: CLINIC | Age: 83
End: 2023-01-05

## 2023-05-02 NOTE — DISCHARGE NOTE PROVIDER - NSDCADMDATE_GEN_ALL_CORE_FT
17-Aug-2020 02:33
17 yo healthy male presents to [facility] scheduled for a laparoscopic left varicocelectomy - right hydrocelectomy on 5/9 with Dr. Narayan. Reports H/O right testicular swelling and discomfort and testicular torsion (not sure which side), both of which has resolved. Denies pain and discomfort and urinary symptomology

## 2023-05-18 ENCOUNTER — APPOINTMENT (OUTPATIENT)
Dept: ORTHOPEDIC SURGERY | Facility: CLINIC | Age: 83
End: 2023-05-18

## 2023-07-27 ENCOUNTER — APPOINTMENT (OUTPATIENT)
Dept: ORTHOPEDIC SURGERY | Facility: CLINIC | Age: 83
End: 2023-07-27

## 2024-01-10 ENCOUNTER — INPATIENT (INPATIENT)
Facility: HOSPITAL | Age: 84
LOS: 1 days | Discharge: HOME | End: 2024-01-12
Attending: HOSPITALIST | Admitting: HOSPITALIST
Payer: MEDICARE

## 2024-01-10 VITALS
RESPIRATION RATE: 20 BRPM | OXYGEN SATURATION: 98 % | TEMPERATURE: 98 F | SYSTOLIC BLOOD PRESSURE: 147 MMHG | DIASTOLIC BLOOD PRESSURE: 71 MMHG | HEART RATE: 100 BPM

## 2024-01-10 DIAGNOSIS — I10 ESSENTIAL (PRIMARY) HYPERTENSION: ICD-10-CM

## 2024-01-10 DIAGNOSIS — R10.9 UNSPECIFIED ABDOMINAL PAIN: ICD-10-CM

## 2024-01-10 DIAGNOSIS — Z90.710 ACQUIRED ABSENCE OF BOTH CERVIX AND UTERUS: Chronic | ICD-10-CM

## 2024-01-10 DIAGNOSIS — Z29.9 ENCOUNTER FOR PROPHYLACTIC MEASURES, UNSPECIFIED: ICD-10-CM

## 2024-01-10 LAB
ALBUMIN SERPL ELPH-MCNC: 4.2 G/DL — SIGNIFICANT CHANGE UP (ref 3.3–5)
ALBUMIN SERPL ELPH-MCNC: 4.2 G/DL — SIGNIFICANT CHANGE UP (ref 3.3–5)
ALP SERPL-CCNC: 53 U/L — SIGNIFICANT CHANGE UP (ref 40–120)
ALP SERPL-CCNC: 53 U/L — SIGNIFICANT CHANGE UP (ref 40–120)
ALT FLD-CCNC: 13 U/L — SIGNIFICANT CHANGE UP (ref 4–33)
ALT FLD-CCNC: 13 U/L — SIGNIFICANT CHANGE UP (ref 4–33)
ANION GAP SERPL CALC-SCNC: 15 MMOL/L — HIGH (ref 7–14)
ANION GAP SERPL CALC-SCNC: 15 MMOL/L — HIGH (ref 7–14)
ANISOCYTOSIS BLD QL: SLIGHT — SIGNIFICANT CHANGE UP
ANISOCYTOSIS BLD QL: SLIGHT — SIGNIFICANT CHANGE UP
APPEARANCE UR: CLEAR — SIGNIFICANT CHANGE UP
APPEARANCE UR: CLEAR — SIGNIFICANT CHANGE UP
AST SERPL-CCNC: 26 U/L — SIGNIFICANT CHANGE UP (ref 4–32)
AST SERPL-CCNC: 26 U/L — SIGNIFICANT CHANGE UP (ref 4–32)
BACTERIA # UR AUTO: ABNORMAL /HPF
BACTERIA # UR AUTO: ABNORMAL /HPF
BASE EXCESS BLDV CALC-SCNC: 0.1 MMOL/L — SIGNIFICANT CHANGE UP (ref -2–3)
BASE EXCESS BLDV CALC-SCNC: 0.1 MMOL/L — SIGNIFICANT CHANGE UP (ref -2–3)
BASOPHILS # BLD AUTO: 0.08 K/UL — SIGNIFICANT CHANGE UP (ref 0–0.2)
BASOPHILS # BLD AUTO: 0.08 K/UL — SIGNIFICANT CHANGE UP (ref 0–0.2)
BASOPHILS NFR BLD AUTO: 0.9 % — SIGNIFICANT CHANGE UP (ref 0–2)
BASOPHILS NFR BLD AUTO: 0.9 % — SIGNIFICANT CHANGE UP (ref 0–2)
BILIRUB SERPL-MCNC: 0.4 MG/DL — SIGNIFICANT CHANGE UP (ref 0.2–1.2)
BILIRUB SERPL-MCNC: 0.4 MG/DL — SIGNIFICANT CHANGE UP (ref 0.2–1.2)
BILIRUB UR-MCNC: NEGATIVE — SIGNIFICANT CHANGE UP
BILIRUB UR-MCNC: NEGATIVE — SIGNIFICANT CHANGE UP
BLOOD GAS VENOUS COMPREHENSIVE RESULT: SIGNIFICANT CHANGE UP
BLOOD GAS VENOUS COMPREHENSIVE RESULT: SIGNIFICANT CHANGE UP
BUN SERPL-MCNC: 13 MG/DL — SIGNIFICANT CHANGE UP (ref 7–23)
BUN SERPL-MCNC: 13 MG/DL — SIGNIFICANT CHANGE UP (ref 7–23)
CALCIUM SERPL-MCNC: 9.5 MG/DL — SIGNIFICANT CHANGE UP (ref 8.4–10.5)
CALCIUM SERPL-MCNC: 9.5 MG/DL — SIGNIFICANT CHANGE UP (ref 8.4–10.5)
CAST: 1 /LPF — SIGNIFICANT CHANGE UP (ref 0–4)
CAST: 1 /LPF — SIGNIFICANT CHANGE UP (ref 0–4)
CHLORIDE BLDV-SCNC: 102 MMOL/L — SIGNIFICANT CHANGE UP (ref 96–108)
CHLORIDE BLDV-SCNC: 102 MMOL/L — SIGNIFICANT CHANGE UP (ref 96–108)
CHLORIDE SERPL-SCNC: 100 MMOL/L — SIGNIFICANT CHANGE UP (ref 98–107)
CHLORIDE SERPL-SCNC: 100 MMOL/L — SIGNIFICANT CHANGE UP (ref 98–107)
CO2 BLDV-SCNC: 26.7 MMOL/L — HIGH (ref 22–26)
CO2 BLDV-SCNC: 26.7 MMOL/L — HIGH (ref 22–26)
CO2 SERPL-SCNC: 22 MMOL/L — SIGNIFICANT CHANGE UP (ref 22–31)
CO2 SERPL-SCNC: 22 MMOL/L — SIGNIFICANT CHANGE UP (ref 22–31)
COLOR SPEC: YELLOW — SIGNIFICANT CHANGE UP
COLOR SPEC: YELLOW — SIGNIFICANT CHANGE UP
CREAT SERPL-MCNC: 0.66 MG/DL — SIGNIFICANT CHANGE UP (ref 0.5–1.3)
CREAT SERPL-MCNC: 0.66 MG/DL — SIGNIFICANT CHANGE UP (ref 0.5–1.3)
DIFF PNL FLD: NEGATIVE — SIGNIFICANT CHANGE UP
DIFF PNL FLD: NEGATIVE — SIGNIFICANT CHANGE UP
EGFR: 87 ML/MIN/1.73M2 — SIGNIFICANT CHANGE UP
EGFR: 87 ML/MIN/1.73M2 — SIGNIFICANT CHANGE UP
EOSINOPHIL # BLD AUTO: 0 K/UL — SIGNIFICANT CHANGE UP (ref 0–0.5)
EOSINOPHIL # BLD AUTO: 0 K/UL — SIGNIFICANT CHANGE UP (ref 0–0.5)
EOSINOPHIL NFR BLD AUTO: 0 % — SIGNIFICANT CHANGE UP (ref 0–6)
EOSINOPHIL NFR BLD AUTO: 0 % — SIGNIFICANT CHANGE UP (ref 0–6)
GAS PNL BLDV: 134 MMOL/L — LOW (ref 136–145)
GAS PNL BLDV: 134 MMOL/L — LOW (ref 136–145)
GAS PNL BLDV: SIGNIFICANT CHANGE UP
GAS PNL BLDV: SIGNIFICANT CHANGE UP
GLUCOSE BLDV-MCNC: 194 MG/DL — HIGH (ref 70–99)
GLUCOSE BLDV-MCNC: 194 MG/DL — HIGH (ref 70–99)
GLUCOSE SERPL-MCNC: 183 MG/DL — HIGH (ref 70–99)
GLUCOSE SERPL-MCNC: 183 MG/DL — HIGH (ref 70–99)
GLUCOSE UR QL: 100 MG/DL
GLUCOSE UR QL: 100 MG/DL
HCO3 BLDV-SCNC: 25 MMOL/L — SIGNIFICANT CHANGE UP (ref 22–29)
HCO3 BLDV-SCNC: 25 MMOL/L — SIGNIFICANT CHANGE UP (ref 22–29)
HCT VFR BLD CALC: 39.1 % — SIGNIFICANT CHANGE UP (ref 34.5–45)
HCT VFR BLD CALC: 39.1 % — SIGNIFICANT CHANGE UP (ref 34.5–45)
HCT VFR BLDA CALC: 36 % — SIGNIFICANT CHANGE UP (ref 34.5–46.5)
HCT VFR BLDA CALC: 36 % — SIGNIFICANT CHANGE UP (ref 34.5–46.5)
HGB BLD CALC-MCNC: 12 G/DL — SIGNIFICANT CHANGE UP (ref 11.7–16.1)
HGB BLD CALC-MCNC: 12 G/DL — SIGNIFICANT CHANGE UP (ref 11.7–16.1)
HGB BLD-MCNC: 11.7 G/DL — SIGNIFICANT CHANGE UP (ref 11.5–15.5)
HGB BLD-MCNC: 11.7 G/DL — SIGNIFICANT CHANGE UP (ref 11.5–15.5)
IANC: 7.26 K/UL — SIGNIFICANT CHANGE UP (ref 1.8–7.4)
IANC: 7.26 K/UL — SIGNIFICANT CHANGE UP (ref 1.8–7.4)
KETONES UR-MCNC: 40 MG/DL
KETONES UR-MCNC: 40 MG/DL
LACTATE BLDV-MCNC: 2.6 MMOL/L — HIGH (ref 0.5–2)
LACTATE BLDV-MCNC: 2.6 MMOL/L — HIGH (ref 0.5–2)
LEUKOCYTE ESTERASE UR-ACNC: ABNORMAL
LEUKOCYTE ESTERASE UR-ACNC: ABNORMAL
LIDOCAIN IGE QN: 31 U/L — SIGNIFICANT CHANGE UP (ref 7–60)
LIDOCAIN IGE QN: 31 U/L — SIGNIFICANT CHANGE UP (ref 7–60)
LYMPHOCYTES # BLD AUTO: 0.81 K/UL — LOW (ref 1–3.3)
LYMPHOCYTES # BLD AUTO: 0.81 K/UL — LOW (ref 1–3.3)
LYMPHOCYTES # BLD AUTO: 9.6 % — LOW (ref 13–44)
LYMPHOCYTES # BLD AUTO: 9.6 % — LOW (ref 13–44)
MANUAL SMEAR VERIFICATION: SIGNIFICANT CHANGE UP
MANUAL SMEAR VERIFICATION: SIGNIFICANT CHANGE UP
MCHC RBC-ENTMCNC: 21.9 PG — LOW (ref 27–34)
MCHC RBC-ENTMCNC: 21.9 PG — LOW (ref 27–34)
MCHC RBC-ENTMCNC: 29.9 GM/DL — LOW (ref 32–36)
MCHC RBC-ENTMCNC: 29.9 GM/DL — LOW (ref 32–36)
MCV RBC AUTO: 73.2 FL — LOW (ref 80–100)
MCV RBC AUTO: 73.2 FL — LOW (ref 80–100)
MICROCYTES BLD QL: SLIGHT — SIGNIFICANT CHANGE UP
MICROCYTES BLD QL: SLIGHT — SIGNIFICANT CHANGE UP
MONOCYTES # BLD AUTO: 0 K/UL — SIGNIFICANT CHANGE UP (ref 0–0.9)
MONOCYTES # BLD AUTO: 0 K/UL — SIGNIFICANT CHANGE UP (ref 0–0.9)
MONOCYTES NFR BLD AUTO: 0 % — LOW (ref 2–14)
MONOCYTES NFR BLD AUTO: 0 % — LOW (ref 2–14)
NEUTROPHILS # BLD AUTO: 7.22 K/UL — SIGNIFICANT CHANGE UP (ref 1.8–7.4)
NEUTROPHILS # BLD AUTO: 7.22 K/UL — SIGNIFICANT CHANGE UP (ref 1.8–7.4)
NEUTROPHILS NFR BLD AUTO: 86 % — HIGH (ref 43–77)
NEUTROPHILS NFR BLD AUTO: 86 % — HIGH (ref 43–77)
NITRITE UR-MCNC: NEGATIVE — SIGNIFICANT CHANGE UP
NITRITE UR-MCNC: NEGATIVE — SIGNIFICANT CHANGE UP
OVALOCYTES BLD QL SMEAR: SLIGHT — SIGNIFICANT CHANGE UP
OVALOCYTES BLD QL SMEAR: SLIGHT — SIGNIFICANT CHANGE UP
PCO2 BLDV: 43 MMHG — SIGNIFICANT CHANGE UP (ref 39–52)
PCO2 BLDV: 43 MMHG — SIGNIFICANT CHANGE UP (ref 39–52)
PH BLDV: 7.38 — SIGNIFICANT CHANGE UP (ref 7.32–7.43)
PH BLDV: 7.38 — SIGNIFICANT CHANGE UP (ref 7.32–7.43)
PH UR: 7.5 — SIGNIFICANT CHANGE UP (ref 5–8)
PH UR: 7.5 — SIGNIFICANT CHANGE UP (ref 5–8)
PLAT MORPH BLD: NORMAL — SIGNIFICANT CHANGE UP
PLAT MORPH BLD: NORMAL — SIGNIFICANT CHANGE UP
PLATELET # BLD AUTO: 322 K/UL — SIGNIFICANT CHANGE UP (ref 150–400)
PLATELET # BLD AUTO: 322 K/UL — SIGNIFICANT CHANGE UP (ref 150–400)
PLATELET COUNT - ESTIMATE: NORMAL — SIGNIFICANT CHANGE UP
PLATELET COUNT - ESTIMATE: NORMAL — SIGNIFICANT CHANGE UP
PO2 BLDV: 39 MMHG — SIGNIFICANT CHANGE UP (ref 25–45)
PO2 BLDV: 39 MMHG — SIGNIFICANT CHANGE UP (ref 25–45)
POIKILOCYTOSIS BLD QL AUTO: SLIGHT — SIGNIFICANT CHANGE UP
POIKILOCYTOSIS BLD QL AUTO: SLIGHT — SIGNIFICANT CHANGE UP
POLYCHROMASIA BLD QL SMEAR: SLIGHT — SIGNIFICANT CHANGE UP
POLYCHROMASIA BLD QL SMEAR: SLIGHT — SIGNIFICANT CHANGE UP
POTASSIUM BLDV-SCNC: 3.9 MMOL/L — SIGNIFICANT CHANGE UP (ref 3.5–5.1)
POTASSIUM BLDV-SCNC: 3.9 MMOL/L — SIGNIFICANT CHANGE UP (ref 3.5–5.1)
POTASSIUM SERPL-MCNC: 4.2 MMOL/L — SIGNIFICANT CHANGE UP (ref 3.5–5.3)
POTASSIUM SERPL-MCNC: 4.2 MMOL/L — SIGNIFICANT CHANGE UP (ref 3.5–5.3)
POTASSIUM SERPL-SCNC: 4.2 MMOL/L — SIGNIFICANT CHANGE UP (ref 3.5–5.3)
POTASSIUM SERPL-SCNC: 4.2 MMOL/L — SIGNIFICANT CHANGE UP (ref 3.5–5.3)
PROT SERPL-MCNC: 7.5 G/DL — SIGNIFICANT CHANGE UP (ref 6–8.3)
PROT SERPL-MCNC: 7.5 G/DL — SIGNIFICANT CHANGE UP (ref 6–8.3)
PROT UR-MCNC: SIGNIFICANT CHANGE UP MG/DL
PROT UR-MCNC: SIGNIFICANT CHANGE UP MG/DL
RBC # BLD: 5.34 M/UL — HIGH (ref 3.8–5.2)
RBC # BLD: 5.34 M/UL — HIGH (ref 3.8–5.2)
RBC # FLD: 14.6 % — HIGH (ref 10.3–14.5)
RBC # FLD: 14.6 % — HIGH (ref 10.3–14.5)
RBC BLD AUTO: ABNORMAL
RBC BLD AUTO: ABNORMAL
RBC CASTS # UR COMP ASSIST: 2 /HPF — SIGNIFICANT CHANGE UP (ref 0–4)
RBC CASTS # UR COMP ASSIST: 2 /HPF — SIGNIFICANT CHANGE UP (ref 0–4)
SAO2 % BLDV: 64.6 % — LOW (ref 67–88)
SAO2 % BLDV: 64.6 % — LOW (ref 67–88)
SODIUM SERPL-SCNC: 137 MMOL/L — SIGNIFICANT CHANGE UP (ref 135–145)
SODIUM SERPL-SCNC: 137 MMOL/L — SIGNIFICANT CHANGE UP (ref 135–145)
SP GR SPEC: 1.02 — SIGNIFICANT CHANGE UP (ref 1–1.03)
SP GR SPEC: 1.02 — SIGNIFICANT CHANGE UP (ref 1–1.03)
SQUAMOUS # UR AUTO: 10 /HPF — HIGH (ref 0–5)
SQUAMOUS # UR AUTO: 10 /HPF — HIGH (ref 0–5)
UROBILINOGEN FLD QL: 0.2 MG/DL — SIGNIFICANT CHANGE UP (ref 0.2–1)
UROBILINOGEN FLD QL: 0.2 MG/DL — SIGNIFICANT CHANGE UP (ref 0.2–1)
VARIANT LYMPHS # BLD: 3.5 % — SIGNIFICANT CHANGE UP (ref 0–6)
VARIANT LYMPHS # BLD: 3.5 % — SIGNIFICANT CHANGE UP (ref 0–6)
WBC # BLD: 8.39 K/UL — SIGNIFICANT CHANGE UP (ref 3.8–10.5)
WBC # BLD: 8.39 K/UL — SIGNIFICANT CHANGE UP (ref 3.8–10.5)
WBC # FLD AUTO: 8.39 K/UL — SIGNIFICANT CHANGE UP (ref 3.8–10.5)
WBC # FLD AUTO: 8.39 K/UL — SIGNIFICANT CHANGE UP (ref 3.8–10.5)
WBC UR QL: 3 /HPF — SIGNIFICANT CHANGE UP (ref 0–5)
WBC UR QL: 3 /HPF — SIGNIFICANT CHANGE UP (ref 0–5)

## 2024-01-10 PROCEDURE — 76705 ECHO EXAM OF ABDOMEN: CPT | Mod: 26

## 2024-01-10 PROCEDURE — 99285 EMERGENCY DEPT VISIT HI MDM: CPT

## 2024-01-10 PROCEDURE — 74177 CT ABD & PELVIS W/CONTRAST: CPT | Mod: 26,MA

## 2024-01-10 PROCEDURE — 99284 EMERGENCY DEPT VISIT MOD MDM: CPT

## 2024-01-10 PROCEDURE — 99223 1ST HOSP IP/OBS HIGH 75: CPT

## 2024-01-10 RX ORDER — MORPHINE SULFATE 50 MG/1
2 CAPSULE, EXTENDED RELEASE ORAL ONCE
Refills: 0 | Status: DISCONTINUED | OUTPATIENT
Start: 2024-01-10 | End: 2024-01-10

## 2024-01-10 RX ORDER — FAMOTIDINE 10 MG/ML
20 INJECTION INTRAVENOUS ONCE
Refills: 0 | Status: COMPLETED | OUTPATIENT
Start: 2024-01-10 | End: 2024-01-10

## 2024-01-10 RX ORDER — ACETAMINOPHEN 500 MG
650 TABLET ORAL EVERY 6 HOURS
Refills: 0 | Status: DISCONTINUED | OUTPATIENT
Start: 2024-01-10 | End: 2024-01-12

## 2024-01-10 RX ORDER — ASPIRIN/CALCIUM CARB/MAGNESIUM 324 MG
81 TABLET ORAL DAILY
Refills: 0 | Status: DISCONTINUED | OUTPATIENT
Start: 2024-01-10 | End: 2024-01-12

## 2024-01-10 RX ORDER — ACETAMINOPHEN 500 MG
1000 TABLET ORAL ONCE
Refills: 0 | Status: COMPLETED | OUTPATIENT
Start: 2024-01-10 | End: 2024-01-10

## 2024-01-10 RX ORDER — ONDANSETRON 8 MG/1
4 TABLET, FILM COATED ORAL ONCE
Refills: 0 | Status: COMPLETED | OUTPATIENT
Start: 2024-01-10 | End: 2024-01-10

## 2024-01-10 RX ORDER — ONDANSETRON 8 MG/1
4 TABLET, FILM COATED ORAL EVERY 8 HOURS
Refills: 0 | Status: DISCONTINUED | OUTPATIENT
Start: 2024-01-10 | End: 2024-01-12

## 2024-01-10 RX ORDER — AMLODIPINE BESYLATE 2.5 MG/1
1 TABLET ORAL
Qty: 0 | Refills: 0 | DISCHARGE

## 2024-01-10 RX ORDER — AMLODIPINE BESYLATE 2.5 MG/1
5 TABLET ORAL DAILY
Refills: 0 | Status: DISCONTINUED | OUTPATIENT
Start: 2024-01-10 | End: 2024-01-12

## 2024-01-10 RX ORDER — SODIUM CHLORIDE 9 MG/ML
1000 INJECTION INTRAMUSCULAR; INTRAVENOUS; SUBCUTANEOUS ONCE
Refills: 0 | Status: COMPLETED | OUTPATIENT
Start: 2024-01-10 | End: 2024-01-10

## 2024-01-10 RX ORDER — PANTOPRAZOLE SODIUM 20 MG/1
40 TABLET, DELAYED RELEASE ORAL DAILY
Refills: 0 | Status: DISCONTINUED | OUTPATIENT
Start: 2024-01-10 | End: 2024-01-12

## 2024-01-10 RX ORDER — LANOLIN ALCOHOL/MO/W.PET/CERES
3 CREAM (GRAM) TOPICAL AT BEDTIME
Refills: 0 | Status: DISCONTINUED | OUTPATIENT
Start: 2024-01-10 | End: 2024-01-12

## 2024-01-10 RX ORDER — SIMETHICONE 80 MG/1
80 TABLET, CHEWABLE ORAL
Refills: 0 | Status: COMPLETED | OUTPATIENT
Start: 2024-01-10 | End: 2024-01-12

## 2024-01-10 RX ORDER — ASPIRIN/CALCIUM CARB/MAGNESIUM 324 MG
1 TABLET ORAL
Qty: 0 | Refills: 0 | DISCHARGE

## 2024-01-10 RX ORDER — MORPHINE SULFATE 50 MG/1
4 CAPSULE, EXTENDED RELEASE ORAL ONCE
Refills: 0 | Status: DISCONTINUED | OUTPATIENT
Start: 2024-01-10 | End: 2024-01-10

## 2024-01-10 RX ADMIN — PANTOPRAZOLE SODIUM 40 MILLIGRAM(S): 20 TABLET, DELAYED RELEASE ORAL at 17:20

## 2024-01-10 RX ADMIN — MORPHINE SULFATE 2 MILLIGRAM(S): 50 CAPSULE, EXTENDED RELEASE ORAL at 08:56

## 2024-01-10 RX ADMIN — MORPHINE SULFATE 4 MILLIGRAM(S): 50 CAPSULE, EXTENDED RELEASE ORAL at 05:10

## 2024-01-10 RX ADMIN — FAMOTIDINE 20 MILLIGRAM(S): 10 INJECTION INTRAVENOUS at 08:00

## 2024-01-10 RX ADMIN — SIMETHICONE 80 MILLIGRAM(S): 80 TABLET, CHEWABLE ORAL at 23:12

## 2024-01-10 RX ADMIN — ONDANSETRON 4 MILLIGRAM(S): 8 TABLET, FILM COATED ORAL at 05:10

## 2024-01-10 RX ADMIN — Medication 650 MILLIGRAM(S): at 23:12

## 2024-01-10 RX ADMIN — Medication 81 MILLIGRAM(S): at 17:19

## 2024-01-10 RX ADMIN — Medication 400 MILLIGRAM(S): at 08:00

## 2024-01-10 RX ADMIN — AMLODIPINE BESYLATE 5 MILLIGRAM(S): 2.5 TABLET ORAL at 17:19

## 2024-01-10 RX ADMIN — SODIUM CHLORIDE 1000 MILLILITER(S): 9 INJECTION INTRAMUSCULAR; INTRAVENOUS; SUBCUTANEOUS at 05:10

## 2024-01-10 RX ADMIN — SIMETHICONE 80 MILLIGRAM(S): 80 TABLET, CHEWABLE ORAL at 17:26

## 2024-01-10 NOTE — H&P ADULT - TIME BILLING
Review of laboratory data, radiology results, consultants' recommendations, documentation in [place], discussion with patient/house staff/ACP and interdisciplinary staff (such as , social workers, etc). Interventions were performed as documented above. Review of laboratory data, radiology results, consultants' recommendations, documentation in [place], discussion with patient/house staff/ACP and interdisciplinary staff (such as , social workers, etc). Interventions were performed as documented above.

## 2024-01-10 NOTE — ED PROVIDER NOTE - PHYSICAL EXAMINATION
Const: Awake, alert, no acute distress.  Well appearing.  Moving comfortably on stretcher.  HEENT: NC/AT.  Moist mucous membranes.  No pharyngeal erythema, no exudates.  Cardiac: Regular rate and regular rhythm. S1 S2 present.  Peripheral pulses 2+ and symmetric. No LE edema.  Resp: Speaking in full sentences. No evidence of respiratory distress.  Breath sounds clear to auscultation b/l. Normal chest excursion.   Abd: LUQ ttp w/ guarding. No overlying skin changes.  Soft, no rigidity, no rebound tenderness.  No palpable masses.  Normal bowel sounds in all 4 quadrants.  Skin: Normal coloration.  No rashes, abrasions or lacerations.  Neuro: Awake, alert & oriented x 3.  Moves all extremities spontaneously.  No focal deficits.

## 2024-01-10 NOTE — ED ADULT NURSE NOTE - OBJECTIVE STATEMENT
Pt is A&O x 4, ambulatory w/ cane at baseline, shows no signs of acute distress. presents to the ED w/ severe abdominal pain associated w/ nausea and vomiting. Pt states she ate soup from a family member last night and symptoms began soon after. Denies fevers/chills, dizziness/blurry vision, SOB or chest discomfort. VSS upon arrival. Respirations even and unlabored. Left AC 20 gauge IV line placed and labs drawn. Pending CT and US results.

## 2024-01-10 NOTE — ED PROVIDER NOTE - PROGRESS NOTE DETAILS
Steve Murray, DO PGY-3: Labs and CT nonactionable patient has history of medically managed cholecystitis, will obtain right upper quadrant ultrasound to rule out acute cholecystitis as a source of patient's abdominal pain. Kiley EL: pt signed out to me, 83yoF Hx of HTN, medically managed cholecystitis, hx of dilated CBD with normal MRCP 2020, p/w epigastric abd pain a/w nbnb emesis. labs revealing LA 2.6, but otherwise normal labs/LFTs. US revealing cholelithiasis with dilated CBD 1.1cm. on my evaluation pt in significant pain, and inability to tolerate PO, surgery consulted, will evaluate. Additional pain medications ordered. Kiley LE: spoke w/ surgery, no need for surgical intervention. GI emailed and consulted for further recommendations. given substantial pain, and vomiting, will admit for further GI workup, and pain control. Kiley EL: spoke w/ surgery, no need for surgical intervention. GI emailed and consulted for further recommendations. given substantial pain, and vomiting, will admit for further GI workup, and pain control. Kiley Houser PA: family made aware of plan, amenable, will admit. case d/w hospitalist.

## 2024-01-10 NOTE — ED PROVIDER NOTE - ATTENDING CONTRIBUTION TO CARE
84 y/o F with h/o HTN, CAYDEN, gallstones c/b acute cholecystitis (declined surgery) here with abd pain.  Pt reports 1 day of upper abd pain, mild and intermittent associated with poor appetite all day, but worsening around 7pm this evening and becoming constant and worse all night.  (+)nbnb vomiting.  No fever, chills, back pain, cp, sob, constipation, diarrhea, urinary sxs.  Last BM yesterday AM, reported to be normal.  Decreased flatus.     Well appearing, elderly female, lying in stretcher, awake and alert, nontoxic.  AF/VSS.  Lungs cta bl.  Cards nl S1/S2, RRR, no MRG.  Abd soft mildly distended diffusely tender to palpation, no rebound or guarding, neg merino's.      Pt with significant abd tenderness on exam, diffuse and nonfocal concerning for acute intraabd pathology, such as sbo.  Also consider biliary pathology given hx and location of pain, although diffuse tenderness goes against this dx.  Will obtain labs, ekg, ct a/p, ivfs, pain meds and antiemetics, reassess.  Consider dedicated gallbladder imaging if CT is nondiagnostic and cont sxs.

## 2024-01-10 NOTE — CONSULT NOTE ADULT - ASSESSMENT
An 83 year old female with PMhx of HTN, acute cholecystitis treated medically in 2020 presents with abdominal pain, nausea, and vomiting. No signs of cholecystitis on imaging studies.     Recommendations:  - Would recommend admission to medicine for further work up for the gradually tapered CBD with intra-and extra-hepatic biliary duct dilation.  - No acute surgical intervention indicated.       Plans discussed with Dr. Stephen VERDUZCO-Team  [id number] An 83 year old female with PMhx of HTN, acute cholecystitis treated medically in 2020 presents with abdominal pain, nausea, and vomiting. No signs of cholecystitis on imaging studies.     Recommendations:  - Would recommend admission to medicine for further work up for the gradually tapered CBD with intra-and extra-hepatic biliary duct dilation.  - No acute surgical intervention indicated.       Plans discussed with Dr. Stephen VERDUZCO-Team  [id number]

## 2024-01-10 NOTE — ED PROVIDER NOTE - CLINICAL SUMMARY MEDICAL DECISION MAKING FREE TEXT BOX
83-year-old female with history of high blood pressure presents to the ED with abdominal pain for the past day.  Patient says the pain started last about 10 hours ago.  Patient endorses nausea and vomiting.  Patient denies any diarrhea or constipation, withdrawal, fever, chills.  NKDA.  No surgeries in the past.  Noncontributory social history.  Vitals WNL.  Physical exam positive for LUQ ttp.  Differential includes diverticulitis, appendicitis, gastroenteritis. Orders include zofran, morphine, labs, ct abd. Dispo pending labs, imaging and reassessment.

## 2024-01-10 NOTE — ED PROVIDER NOTE - OBJECTIVE STATEMENT
83-year-old female with history of high blood pressure presents to the ED with abdominal pain for the past day.  Patient says the pain started last about 10 hours ago.  Patient endorses nausea and vomiting.  Patient denies any diarrhea or constipation, withdrawal, fever, chills.  NKDA.  No surgeries in the past.  Noncontributory social history.

## 2024-01-10 NOTE — H&P ADULT - NSHPLABSRESULTS_GEN_ALL_CORE
LABS:  CAPILLARY BLOOD GLUCOSE                                11.7   8.39  )-----------( 322      ( 10 Ron 2024 05:12 )             39.1     01-10    137  |  100  |  13  ----------------------------<  183<H>  4.2   |  22  |  0.66    Ca    9.5      10 Ron 2024 05:12    TPro  7.5  /  Alb  4.2  /  TBili  0.4  /  DBili  x   /  AST  26  /  ALT  13  /  AlkPhos  53  01-10          Urinalysis Basic - ( 10 Ron 2024 05:16 )    Color: Yellow / Appearance: Clear / S.022 / pH: x  Gluc: x / Ketone: 40 mg/dL  / Bili: Negative / Urobili: 0.2 mg/dL   Blood: x / Protein: Trace mg/dL / Nitrite: Negative   Leuk Esterase: Trace / RBC: 2 /HPF / WBC 3 /HPF   Sq Epi: x / Non Sq Epi: 10 /HPF / Bacteria: Occasional /HPF          RADIOLOGY & ADDITIONAL TESTS:    Telemetry Personally Reviewed:    ECG Personally Reviewed:    Imaging Personally Reviewed:    Imaging Reviewed:   < from: US Abdomen Limited (01.10.24 @ 07:08) >    Cholelithiasis without sonographic evidence of acute cholecystitis.    Chronically common bile duct.    < end of copied text >    < from: CT Abdomen and Pelvis w/ IV Cont (01.10.24 @ 05:48) >    No bowel obstruction or inflammation.    < end of copied text >    < from: CT Abdomen and Pelvis w/ IV Cont (01.10.24 @ 05:48) >    BOWEL: No bowel obstruction or inflammation. Appendix is normal.    < end of copied text >      Consultant(s) Notes Reviewed:      Care Discussed with Consultants/Other Providers:

## 2024-01-10 NOTE — ED PROVIDER NOTE - PROGRESS NOTE ADDITIONAL2
On 11/3/2020, Armando BEY, CT scribed the services personally performed by Aggie Beasley DC      Date of Injury: 09/22/2020     Initial Treatment Date: 09/22/2020  Previous Treatment Date: 10/13/2020  Current Treatment Date: 11/3/2020   Chief Complaint   Patient presents with   • Pain   • Office Visit        Mechanism of Injury: Gradually  Date informed consent signed:  09/22/2020    Visit Number of Current Episode: 4    Occupation:   Referred by: Self  Primary Care Physician: Raquel Chavez MD    SUBJECTIVE:  Jacquelin is a pleasant 61 year old female that presents to our office today for treatment of neck and back pain without  radicular symptoms.     Post treatment felt good. A week ago had a strange lower back pain on the left side which is not her usual.  Woke up with it and it was hard to get comfortable for 3 days.  Ibuprofen did help.  Still has it but  feels like a dull ache. Neck today feels good today along with upper shoulders.  Upper back feels good as well.            Presenting Hx:  Working from home and says her work station is not ideal but trying to make modifications as she can. Sits most of the day but gets up for short breaks to stand or stretch. Lower back ache is more right sided and does go into her right buttock but not going down the right leg. Has ha d a history of low back pain but says her neck pain is worse at this time and more flared-up.  She does core strengthening exercises regularly.  She denies numbness, tingling and pain into extremities.  Neck stiffness and increased cold sore occurrence, stress and neck pain induced.           Patient rates the current pain at a 2/10 with 10 being the worst.  Patient describes pain as throbbing, stiffness and aching    Patient’s current work status: Currently working.  Patient notes that the pain is worse when sitting  Patient notes that the pain is reduced with heating pad  Patient has not sought prior medical treatment for  this episode.  Patient has sought prior Chiropractic treatment for this episode, *outside provider.    Diagnostic Studies relevant to this episode: None  Hospitalizations relevant to this episode: NO    ________________________________________________________________________    I have reviewed the past medical history, past surgical history, family history, social history, medications and allergies listed in the medical record as obtained by my support staff and agree with their documentation.    REVIEW OF SYSTEMS  Constitutional: Negative for fever chills, malaise, weight loss/gain, or loss of appetite.    Skin: Negative for rash, or persistent itching.   HEENT: Negative for eye drainage, ear pain, sore throat, or sinus problems.  Respiratory: Negative cough, wheezing, or shortness of breath.    Cardiovascular: Negative for chest pain, chest pressure, palpitations, leg cramps, or lower extremity edema.  Gastrointestinal: Negative for nausea, vomiting, diarrhea, abdominal pain, constipation, or heartburn.    Genitourinary: Negative for dysuria, frequency, hematuria, or nocturia.   Extremities:  Negative for joint swelling or joint pain.  Endocrine: Negative for heat or cold intolerance.  Psychiatric: Negative for change in mood, mentation, anxiety, depression, or insomnia.   Neurologic: Negative for visual changes, loss of balance, dizziness, or tremors.      OBJECTIVE FINDINGS:   There were no vitals taken for this visit.   Patient scored a 15/40 on the [employer].    Problem focus examination revealed:    (Cervical spine) Cervical spine facet joint function is within normal limits except for long axis extension contacting OCC,  C2 left posterior, C5 right posterior that exhibited limited passive range of motion and segmental restriction with tenderness upon palpation. The following muscles were examined for normal flexibility and tone; bilateral cervicothoracic paraspinals , right scalene, right sternocleidomastoid   and right suboccipital. These muscles were within normal limits except for right levator scapulae, right suboccipital, left suboccipital and right trapezius muscles, that exhibited limited flexibility and were hypertonic at rest.    (Thoracic spine) Thoracic spine facet joint function is within normal limits.  The following muscles were examined for normal flexibility and tone; bilateral cervicothoracic paraspinals and right thoracic paraspinals. These muscles were within normal limits except for right scapulothoracic that exhibited limited flexibility and abnormal resting tone.    (Lumbar and Pelvic spine) Lumbar spine facet joint function is within normal limits except for long axis extension/L4-L5/S1 that exhibited limited passive range of motion and segmental restriction and tenderness on palpation; sacroiliac joint function is within normal limits. The following muscles were examined for flexibility and tone at rest; left gluteus edgar.  These muscles were found to be within normal limits except for left gluteus medius and right gluteus medius that exhibited limited flexibility and were hypertonic at rest.      ASSESSMENT:  1. Somatic dysfunction of lumbar region    2. Mechanical back pain    3. Cervicalgia    4. Cervical segment dysfunction          PLAN:  Following reassessment of joint function and soft tissue tonicity, Jacquelin was treated with manual supine cervical and lumbar distraction manipulation utilizing Shay Protocol 2 to stretch lumbar paraspinal muscles, to increase intersegmental joint function, and to decrease intradiscal pressure and neural tension of her lumbar spine; along with supine cervical to improve function and passive range of motion of facet joints noted.  Prior to manipulative therapies patient was treated with Posterior kinetic change tapping and brief soft tissue mobilization to muscles noted as taut in objective findings to improve flexibility and decrease strain to associated  spinal structures.      Goal of care is to improve muscular and skeletal function and provide symptom relief. Patient to return in 3 weeks for continued treatment.    Total exam and treatment time was 20 minutes.         The documentation recorded by ILEANA Justice accurately and completely reflects the service(s), I personally performed and the decisions made by me, Aggie Beasley D.C.           Additional Progress Note...

## 2024-01-10 NOTE — H&P ADULT - NSHPREVIEWOFSYSTEMS_GEN_ALL_CORE
REVIEW OF SYSTEMS:    CONSTITUTIONAL: No weakness, fevers or chills  EYES/ENT: No visual changes;  no throat pain   NECK: No pain or stiffness  RESPIRATORY: No cough, wheezing, hemoptysis; No shortness of breath  CARDIOVASCULAR: No chest pain or palpitations  GASTROINTESTINAL: ++  abdominal No epigastric pain. No nausea, vomiting, or hematemesis; No diarrhea or constipation. No melena or hematochezia.  GENITOURINARY: No dysuria, change in frequency or hematuria  NEUROLOGICAL: No numbness or weakness  SKIN: No itching, burning, rashes, or lesions   Psych: No depression   All other review of systems is negative unless indicated above. REVIEW OF SYSTEMS:    CONSTITUTIONAL: No weakness, fevers or chills  EYES/ENT: No visual changes;  no throat pain   NECK: No pain or stiffness  RESPIRATORY: No cough, wheezing, hemoptysis; No shortness of breath  CARDIOVASCULAR: No chest pain or palpitations  GASTROINTESTINAL: ++  abdominal No epigastric pain. ++ nausea, vomiting, or hematemesis; No diarrhea or constipation. No melena or hematochezia.  GENITOURINARY: No dysuria, change in frequency or hematuria  NEUROLOGICAL: No numbness or weakness  SKIN: No itching, burning, rashes, or lesions   Psych: No depression   All other review of systems is negative unless indicated above.

## 2024-01-10 NOTE — H&P ADULT - ASSESSMENT
83 year old female with PMhx of HTN, acute cholecystitis treated medically in 2020 presents with abdominal pain, nausea, and vomiting. 83 year old female with PMhx of HTN, acute cholecystitis treated medically in 2020 presents with abdominal pain, nausea, and vomiting. Patient found to have Cholelithiasis without sonographic evidence of acute cholecystitis. on abdominal ultrasound with no bowel obstruction on CT A/P, admitted for further management.

## 2024-01-10 NOTE — CONSULT NOTE ADULT - ATTENDING COMMENTS
Dilated cbd on imaging with dilated intrahepatic ducts. No evidence of acute cholecystitis.  recommend  mrcp for evaluation of biliary system  will continue to follow    I have personally interviewed and examined this patient, reviewed pertinent labs and imaging, and discussed the case with colleagues, residents, and physician assistants on B Team rounds.    The active care issues are:  1. r/o cbd stones    The Acute Care Surgery (B Team) Attending Group Practice:  Dr. Micah Le, Dr. Alexis Miller, Dr. Steve Davila, Dr. Dashawn Mitchell,     urgent issues - spectra [phone]  nonurgent issues - [phone]  patient appointments or afterhours - [phone] Dilated cbd on imaging with dilated intrahepatic ducts. No evidence of acute cholecystitis.  recommend  mrcp for evaluation of biliary system  will continue to follow    I have personally interviewed and examined this patient, reviewed pertinent labs and imaging, and discussed the case with colleagues, residents, and physician assistants on B Team rounds.    The active care issues are:  1. r/o cbd stones    The Acute Care Surgery (B Team) Attending Group Practice:  Dr. Micah Le, Dr. Alexis Miller, Dr. Steve Davila, Dr. Dashawn Mitchell,     urgent issues - spectra [phone]  nonurgent issues - [phone]  patient appointments or afterhours - [phone]

## 2024-01-10 NOTE — H&P ADULT - HISTORY OF PRESENT ILLNESS
83 year old female with PMHx of HTN and acute cholecystitis treated medically in 2020 presenting with abdominal pain. Patient was in her USOH good health, noted to have onset of epigastric pain, which spread to right and left upper quadrant. Reports  8/10  associated with nausea and vomiting. Reports non bloody emesis.  Patient is passing gas and having a bowel movement. Denies having this pain before.        In the ED, the patient is afebrile with WBCs of 8k (neutrophil predominance) and normal Total bilirubin and liver enzymes. CT scan shows mild dilation of the intra-and extra-hepatic biliary ducts with gradual tapering of the CBD admitted to medicine for further management. GI and surgery consulted.   83 year old female with PMHx of HTN and acute cholecystitis treated medically in 2020 presenting with abdominal pain. Patient was in her USOH good health, noted to have onset of epigastric pain, which spread to right and left upper quadrant. Reports  8/10  associated with nausea and vomiting. She describes pain as  Reports non bloody emesis.  Patient is passing gas and having a bowel movement. Denies having this pain before.        In the ED, the patient is afebrile with WBCs of 8k (neutrophil predominance) and normal Total bilirubin and liver enzymes. CT scan shows mild dilation of the intra-and extra-hepatic biliary ducts with gradual tapering of the CBD admitted to medicine for further management. GI and surgery consulted.   83 year old female with PMHx of HTN and acute cholecystitis treated medically in 2020 presenting with abdominal pain. Patient was in her USOH good health, noted to have onset of epigastric pain, which spread to right and left upper quadrant. Reports  8/10  associated with nausea and vomiting. She describes pain as stabbing/crampy pain.   Reports non bloody emesis.  Patient is passing gas and having a bowel movement. Denies having this pain before. Cannot identify any relieving and exacerbating factors. Denies any fevers, chills. Diarrhea, Constipation. Denies trauma to the area or trying new food.  Given this discomfort, this prompted her to present to the ED.      In the ED, the patient is afebrile with WBCs of 8k (neutrophil predominance) and normal Total bilirubin and liver enzymes. CT scan shows mild dilation of the intra-and extra-hepatic biliary ducts with gradual tapering of the CBD admitted to medicine for further management. GI and surgery consulted.

## 2024-01-10 NOTE — H&P ADULT - NSHPPHYSICALEXAM_GEN_ALL_CORE
GENERAL: NAD  HEENT: EOMI, MMM, no oropharyngeal lesions or erythema appreciated  Pulm: normal work of breathing, CTABL  CV: RRR, S1&S2+, no m/r/g appreciated  ABDOMEN: soft, nt, nd, no hepatosplenomegaly  MSK: nl ROM  EXTREMITIES:  no appreciable edema in b/l LE  Neuro: A&Ox3, no focal deficits  SKIN: warm and dry, no visible rash GENERAL: Elderly woman in NAD  HEENT: EOMI, MMM, no oropharyngeal lesions or erythema appreciated  Pulm: normal work of breathing, CTABL  CV: RRR, S1&S2+, no m/r/g appreciated  ABDOMEN: soft, Tender to deep palpation in , nd, no hepatosplenomegaly  MSK: nl ROM  EXTREMITIES:  no appreciable edema in b/l LE  Neuro: A&Ox3, no focal deficits  SKIN: warm and dry, no visible rash

## 2024-01-10 NOTE — CONSULT NOTE ADULT - SUBJECTIVE AND OBJECTIVE BOX
HPI:  An 83 year old female with PMHx of HTN, CAYDEN and prior history of acute cholecystitis treated medically in . The patients presents with one-day of abdominal pain 8/10 in severity globally non-radiating associated with nausea and vomiting. Patient is passing gas and having a bowel movement. In the ED, the patient is afebirle, with WBCs of 8k and normal Total bilirubin and liver enzymes. CT scan shows mild dilation of the intra-and extra-hepatic biliary ducts with gradual tapering of the CBD. Surgery is consulted to rule out acute cholecysitits.      PAST MEDICAL & SURGICAL HISTORY:  Hypertension      Status post total abdominal hysterectomy          MEDICATIONS  (STANDING):  morphine  - Injectable 2 milliGRAM(s) IV Push Once    MEDICATIONS  (PRN):      Allergies    No Known Allergies    Intolerances        SOCIAL HISTORY:    FAMILY HISTORY:      Physical Exam:  General: NAD, resting comfortably  HEENT: no visible deformities.  Pulmonary: normal resp effort  Abdominal: soft, non-distended, tender globally with guarding, no rebound tenderness. Negative Ortega's sign.  Neuro: A/O x 3      Vital Signs Last 24 Hrs  T(C): 36.8 (10 Ron 2024 08:00), Max: 36.9 (10 Ron 2024 03:30)  T(F): 98.2 (10 Ron 2024 08:00), Max: 98.5 (10 Ron 2024 03:30)  HR: 104 (10 Ron 2024 08:00) (100 - 104)  BP: 135/81 (10 Ron 2024 08:00) (135/81 - 147/71)  BP(mean): --  RR: 17 (10 Ron 2024 08:00) (17 - 20)  SpO2: 98% (10 Ron 2024 08:00) (98% - 98%)    Parameters below as of 10 Ron 2024 08:00  Patient On (Oxygen Delivery Method): room air        I&O's Summary          LABS:                        11.7   8.39  )-----------( 322      ( 10 Ron 2024 05:12 )             39.1     01-10    137  |  100  |  13  ----------------------------<  183<H>  4.2   |  22  |  0.66    Ca    9.5      10 Ron 2024 05:12    TPro  7.5  /  Alb  4.2  /  TBili  0.4  /  DBili  x   /  AST  26  /  ALT  13  /  AlkPhos  53  01-10      Urinalysis Basic - ( 10 Ron 2024 05:16 )    Color: Yellow / Appearance: Clear / S.022 / pH: x  Gluc: x / Ketone: 40 mg/dL  / Bili: Negative / Urobili: 0.2 mg/dL   Blood: x / Protein: Trace mg/dL / Nitrite: Negative   Leuk Esterase: Trace / RBC: 2 /HPF / WBC 3 /HPF   Sq Epi: x / Non Sq Epi: 10 /HPF / Bacteria: Occasional /HPF      CAPILLARY BLOOD GLUCOSE        LIVER FUNCTIONS - ( 10 Ron 2024 05:12 )  Alb: 4.2 g/dL / Pro: 7.5 g/dL / ALK PHOS: 53 U/L / ALT: 13 U/L / AST: 26 U/L / GGT: x

## 2024-01-10 NOTE — ED ADULT NURSE NOTE - NSFALLHARMRISKINTERV_ED_ALL_ED
Assistance OOB with selected safe patient handling equipment if applicable/Assistance with ambulation/Communicate risk of Fall with Harm to all staff, patient, and family/Monitor gait and stability/Provide patient with walking aids/Provide visual cue: red socks, yellow wristband, yellow gown, etc/Reinforce activity limits and safety measures with patient and family/Bed in lowest position, wheels locked, appropriate side rails in place/Call bell, personal items and telephone in reach/Instruct patient to call for assistance before getting out of bed/chair/stretcher/Non-slip footwear applied when patient is off stretcher/[place] to call system/Physically safe environment - no spills, clutter or unnecessary equipment/Purposeful Proactive Rounding/Room/bathroom lighting operational, light cord in reach Assistance OOB with selected safe patient handling equipment if applicable/Assistance with ambulation/Communicate risk of Fall with Harm to all staff, patient, and family/Monitor gait and stability/Provide patient with walking aids/Provide visual cue: red socks, yellow wristband, yellow gown, etc/Reinforce activity limits and safety measures with patient and family/Bed in lowest position, wheels locked, appropriate side rails in place/Call bell, personal items and telephone in reach/Instruct patient to call for assistance before getting out of bed/chair/stretcher/Non-slip footwear applied when patient is off stretcher/[place] to call system/Physically safe environment - no spills, clutter or unnecessary equipment/Purposeful Proactive Rounding/Room/bathroom lighting operational, light cord in reach

## 2024-01-10 NOTE — H&P ADULT - PROBLEM SELECTOR PLAN 1
Patient with imaging, BOWEL: No bowel obstruction or inflammation. Appendix is normal.- surgery consulted, appreciate recs   - GI consulted Patient with imaging, BOWEL: No bowel obstruction or inflammation. Appendix is normal.- surgery consulted, appreciate recs   - GI consulted  - Clear liquid diet, ADAT Unclear etiology of abdominal discomfort. Patient with  imaging with no intra abdominal pathologu. , BOWEL: No bowel obstruction or inflammation. Appendix is normal on CT A/P and Cholelithiasis without sonographic evidence of acute cholecystitis on abdominal ultrasound   - surgery consulted, appreciate recs   - GI consulted  - Patient points to epigastric area, will try GI cocktail to see if component of GERD is contributing to discomfort.   - Clear liquid diet, ADAT Unclear etiology of abdominal discomfort. Patient with  imaging with no intra abdominal pathology. , BOWEL: No bowel obstruction or inflammation. Appendix is normal on CT A/P and Cholelithiasis without sonographic evidence of acute cholecystitis on abdominal ultrasound   - surgery consulted, appreciate recs   - GI consulted  - Patient points to epigastric area, will try GI cocktail to see if component of GERD is contributing to discomfort.   - Clear liquid diet, ADAT Unclear etiology of abdominal discomfort. Patient with  imaging with no intra abdominal pathology. , BOWEL: No bowel obstruction or inflammation. Appendix is normal on CT A/P and Cholelithiasis without sonographic evidence of acute cholecystitis on abdominal ultrasound   - surgery consulted, appreciate recs   - GI consulted,  Awaiting Recs  - Patient points to epigastric area, will try GI cocktail to see if component of GERD is contributing to discomfort.   - Clear liquid diet, ADAT

## 2024-01-11 LAB
ALBUMIN SERPL ELPH-MCNC: 3.7 G/DL — SIGNIFICANT CHANGE UP (ref 3.3–5)
ALBUMIN SERPL ELPH-MCNC: 3.7 G/DL — SIGNIFICANT CHANGE UP (ref 3.3–5)
ALP SERPL-CCNC: 53 U/L — SIGNIFICANT CHANGE UP (ref 40–120)
ALP SERPL-CCNC: 53 U/L — SIGNIFICANT CHANGE UP (ref 40–120)
ALT FLD-CCNC: 13 U/L — SIGNIFICANT CHANGE UP (ref 4–33)
ALT FLD-CCNC: 13 U/L — SIGNIFICANT CHANGE UP (ref 4–33)
ANION GAP SERPL CALC-SCNC: 12 MMOL/L — SIGNIFICANT CHANGE UP (ref 7–14)
ANION GAP SERPL CALC-SCNC: 12 MMOL/L — SIGNIFICANT CHANGE UP (ref 7–14)
AST SERPL-CCNC: 26 U/L — SIGNIFICANT CHANGE UP (ref 4–32)
AST SERPL-CCNC: 26 U/L — SIGNIFICANT CHANGE UP (ref 4–32)
BILIRUB SERPL-MCNC: 0.7 MG/DL — SIGNIFICANT CHANGE UP (ref 0.2–1.2)
BILIRUB SERPL-MCNC: 0.7 MG/DL — SIGNIFICANT CHANGE UP (ref 0.2–1.2)
BUN SERPL-MCNC: 10 MG/DL — SIGNIFICANT CHANGE UP (ref 7–23)
BUN SERPL-MCNC: 10 MG/DL — SIGNIFICANT CHANGE UP (ref 7–23)
CALCIUM SERPL-MCNC: 9.4 MG/DL — SIGNIFICANT CHANGE UP (ref 8.4–10.5)
CALCIUM SERPL-MCNC: 9.4 MG/DL — SIGNIFICANT CHANGE UP (ref 8.4–10.5)
CHLORIDE SERPL-SCNC: 104 MMOL/L — SIGNIFICANT CHANGE UP (ref 98–107)
CHLORIDE SERPL-SCNC: 104 MMOL/L — SIGNIFICANT CHANGE UP (ref 98–107)
CO2 SERPL-SCNC: 24 MMOL/L — SIGNIFICANT CHANGE UP (ref 22–31)
CO2 SERPL-SCNC: 24 MMOL/L — SIGNIFICANT CHANGE UP (ref 22–31)
CREAT SERPL-MCNC: 0.8 MG/DL — SIGNIFICANT CHANGE UP (ref 0.5–1.3)
CREAT SERPL-MCNC: 0.8 MG/DL — SIGNIFICANT CHANGE UP (ref 0.5–1.3)
EGFR: 73 ML/MIN/1.73M2 — SIGNIFICANT CHANGE UP
EGFR: 73 ML/MIN/1.73M2 — SIGNIFICANT CHANGE UP
GLUCOSE SERPL-MCNC: 104 MG/DL — HIGH (ref 70–99)
GLUCOSE SERPL-MCNC: 104 MG/DL — HIGH (ref 70–99)
HCT VFR BLD CALC: 39.6 % — SIGNIFICANT CHANGE UP (ref 34.5–45)
HCT VFR BLD CALC: 39.6 % — SIGNIFICANT CHANGE UP (ref 34.5–45)
HGB BLD-MCNC: 12.2 G/DL — SIGNIFICANT CHANGE UP (ref 11.5–15.5)
HGB BLD-MCNC: 12.2 G/DL — SIGNIFICANT CHANGE UP (ref 11.5–15.5)
MCHC RBC-ENTMCNC: 22.2 PG — LOW (ref 27–34)
MCHC RBC-ENTMCNC: 22.2 PG — LOW (ref 27–34)
MCHC RBC-ENTMCNC: 30.8 GM/DL — LOW (ref 32–36)
MCHC RBC-ENTMCNC: 30.8 GM/DL — LOW (ref 32–36)
MCV RBC AUTO: 72 FL — LOW (ref 80–100)
MCV RBC AUTO: 72 FL — LOW (ref 80–100)
NRBC # BLD: 0 /100 WBCS — SIGNIFICANT CHANGE UP (ref 0–0)
NRBC # BLD: 0 /100 WBCS — SIGNIFICANT CHANGE UP (ref 0–0)
NRBC # FLD: 0 K/UL — SIGNIFICANT CHANGE UP (ref 0–0)
NRBC # FLD: 0 K/UL — SIGNIFICANT CHANGE UP (ref 0–0)
PLATELET # BLD AUTO: 304 K/UL — SIGNIFICANT CHANGE UP (ref 150–400)
PLATELET # BLD AUTO: 304 K/UL — SIGNIFICANT CHANGE UP (ref 150–400)
POTASSIUM SERPL-MCNC: 4.8 MMOL/L — SIGNIFICANT CHANGE UP (ref 3.5–5.3)
POTASSIUM SERPL-MCNC: 4.8 MMOL/L — SIGNIFICANT CHANGE UP (ref 3.5–5.3)
POTASSIUM SERPL-SCNC: 4.8 MMOL/L — SIGNIFICANT CHANGE UP (ref 3.5–5.3)
POTASSIUM SERPL-SCNC: 4.8 MMOL/L — SIGNIFICANT CHANGE UP (ref 3.5–5.3)
PROT SERPL-MCNC: 7.3 G/DL — SIGNIFICANT CHANGE UP (ref 6–8.3)
PROT SERPL-MCNC: 7.3 G/DL — SIGNIFICANT CHANGE UP (ref 6–8.3)
RBC # BLD: 5.5 M/UL — HIGH (ref 3.8–5.2)
RBC # BLD: 5.5 M/UL — HIGH (ref 3.8–5.2)
RBC # FLD: 14.8 % — HIGH (ref 10.3–14.5)
RBC # FLD: 14.8 % — HIGH (ref 10.3–14.5)
SODIUM SERPL-SCNC: 140 MMOL/L — SIGNIFICANT CHANGE UP (ref 135–145)
SODIUM SERPL-SCNC: 140 MMOL/L — SIGNIFICANT CHANGE UP (ref 135–145)
WBC # BLD: 9.21 K/UL — SIGNIFICANT CHANGE UP (ref 3.8–10.5)
WBC # BLD: 9.21 K/UL — SIGNIFICANT CHANGE UP (ref 3.8–10.5)
WBC # FLD AUTO: 9.21 K/UL — SIGNIFICANT CHANGE UP (ref 3.8–10.5)
WBC # FLD AUTO: 9.21 K/UL — SIGNIFICANT CHANGE UP (ref 3.8–10.5)

## 2024-01-11 PROCEDURE — 99233 SBSQ HOSP IP/OBS HIGH 50: CPT

## 2024-01-11 RX ORDER — ENOXAPARIN SODIUM 100 MG/ML
40 INJECTION SUBCUTANEOUS EVERY 24 HOURS
Refills: 0 | Status: DISCONTINUED | OUTPATIENT
Start: 2024-01-11 | End: 2024-01-12

## 2024-01-11 RX ORDER — INFLUENZA VIRUS VACCINE 15; 15; 15; 15 UG/.5ML; UG/.5ML; UG/.5ML; UG/.5ML
0.7 SUSPENSION INTRAMUSCULAR ONCE
Refills: 0 | Status: DISCONTINUED | OUTPATIENT
Start: 2024-01-11 | End: 2024-01-12

## 2024-01-11 RX ADMIN — Medication 81 MILLIGRAM(S): at 12:45

## 2024-01-11 RX ADMIN — AMLODIPINE BESYLATE 5 MILLIGRAM(S): 2.5 TABLET ORAL at 12:44

## 2024-01-11 RX ADMIN — SIMETHICONE 80 MILLIGRAM(S): 80 TABLET, CHEWABLE ORAL at 12:44

## 2024-01-11 RX ADMIN — PANTOPRAZOLE SODIUM 40 MILLIGRAM(S): 20 TABLET, DELAYED RELEASE ORAL at 12:45

## 2024-01-11 RX ADMIN — SIMETHICONE 80 MILLIGRAM(S): 80 TABLET, CHEWABLE ORAL at 05:04

## 2024-01-11 RX ADMIN — SIMETHICONE 80 MILLIGRAM(S): 80 TABLET, CHEWABLE ORAL at 18:12

## 2024-01-11 RX ADMIN — SIMETHICONE 80 MILLIGRAM(S): 80 TABLET, CHEWABLE ORAL at 23:35

## 2024-01-11 NOTE — PROGRESS NOTE ADULT - ASSESSMENT
83 year old female with PMhx of HTN, acute cholecystitis treated medically in 2020 presents with abdominal pain, nausea, and vomiting. Patient found to have Cholelithiasis without sonographic evidence of acute cholecystitis. on abdominal ultrasound with no bowel obstruction on CT A/P, admitted for further management.

## 2024-01-11 NOTE — PROGRESS NOTE ADULT - ASSESSMENT
83 year old female with PMhx of HTN, acute cholecystitis treated medically in 2020 presents with abdominal pain, nausea, and vomiting. No signs of cholecystitis on imaging studies.     Recommendations:  - NO evidence of acute cholecystitis, therefore No acute surgical intervention indicated  - can evaluate biliary ducts w/ MRCP  - Follow up and appreciate GI recs  - advance diet to low fat diet  - Can follow up outpatient w/ Acute Care Surgeon for elective cholecystectomy if patient wants surgery (previously refused surgery)         - [phone] [place] [address], Department of Surgery, 2nd floor, [place] [id number]  ([phone])  - Surgery to sign off, call back if abdominal pain is concerning for acute cholecystitis or have questions or concerns    B-Team  [id number]   83 year old female with PMhx of HTN, acute cholecystitis treated medically in 2020 presents with abdominal pain, nausea, and vomiting. No signs of cholecystitis on imaging studies.     Recommendations:  - NO evidence of acute cholecystitis, therefore No acute surgical intervention indicated  - can evaluate biliary ducts w/ MRCP  - Follow up and appreciate GI recs  - advance diet to low fat diet  - Can follow up outpatient w/ Acute Care Surgeon for elective cholecystectomy if patient wants surgery (previously refused surgery)         - [phone] [place] [address], Department of Surgery, 2nd floor, [place] [phone]  ([phone])  - Surgery to sign off, call back if abdominal pain is concerning for acute cholecystitis or have questions or concerns    B-Team  [id number]

## 2024-01-11 NOTE — PATIENT PROFILE ADULT - FALL HARM RISK - HARM RISK INTERVENTIONS
Assistance with ambulation/Assistance OOB with selected safe patient handling equipment/Communicate Risk of Fall with Harm to all staff/Discuss with provider need for PT consult/Monitor gait and stability/Provide patient with walking aids - walker, cane, crutches/Reinforce activity limits and safety measures with patient and family/Tailored Fall Risk Interventions/Visual Cue: Yellow wristband and red socks/Bed in lowest position, wheels locked, appropriate side rails in place/Call bell, personal items and telephone in reach/Instruct patient to call for assistance before getting out of bed or chair/Non-slip footwear when patient is out of bed/[place] to call system/Physically safe environment - no spills, clutter or unnecessary equipment/Purposeful Proactive Rounding/Room/bathroom lighting operational, light cord in reach Assistance with ambulation/Assistance OOB with selected safe patient handling equipment/Communicate Risk of Fall with Harm to all staff/Discuss with provider need for PT consult/Monitor gait and stability/Provide patient with walking aids - walker, cane, crutches/Reinforce activity limits and safety measures with patient and family/Tailored Fall Risk Interventions/Visual Cue: Yellow wristband and red socks/Bed in lowest position, wheels locked, appropriate side rails in place/Call bell, personal items and telephone in reach/Instruct patient to call for assistance before getting out of bed or chair/Non-slip footwear when patient is out of bed/[place] to call system/Physically safe environment - no spills, clutter or unnecessary equipment/Purposeful Proactive Rounding/Room/bathroom lighting operational, light cord in reach

## 2024-01-11 NOTE — PATIENT PROFILE ADULT - FUNCTIONAL ASSESSMENT - BASIC MOBILITY 6.
3-calculated by average/Not able to assess (calculate score using [facility] averaging method)  3-calculated by average/Not able to assess (calculate score using [facility] averaging method)

## 2024-01-12 ENCOUNTER — TRANSCRIPTION ENCOUNTER (OUTPATIENT)
Age: 84
End: 2024-01-12

## 2024-01-12 VITALS
OXYGEN SATURATION: 99 % | DIASTOLIC BLOOD PRESSURE: 60 MMHG | SYSTOLIC BLOOD PRESSURE: 110 MMHG | TEMPERATURE: 99 F | HEART RATE: 97 BPM | RESPIRATION RATE: 18 BRPM

## 2024-01-12 LAB
ALBUMIN SERPL ELPH-MCNC: 3.6 G/DL — SIGNIFICANT CHANGE UP (ref 3.3–5)
ALBUMIN SERPL ELPH-MCNC: 3.6 G/DL — SIGNIFICANT CHANGE UP (ref 3.3–5)
ALP SERPL-CCNC: 48 U/L — SIGNIFICANT CHANGE UP (ref 40–120)
ALP SERPL-CCNC: 48 U/L — SIGNIFICANT CHANGE UP (ref 40–120)
ALT FLD-CCNC: 15 U/L — SIGNIFICANT CHANGE UP (ref 4–33)
ALT FLD-CCNC: 15 U/L — SIGNIFICANT CHANGE UP (ref 4–33)
ANION GAP SERPL CALC-SCNC: 10 MMOL/L — SIGNIFICANT CHANGE UP (ref 7–14)
ANION GAP SERPL CALC-SCNC: 10 MMOL/L — SIGNIFICANT CHANGE UP (ref 7–14)
AST SERPL-CCNC: 24 U/L — SIGNIFICANT CHANGE UP (ref 4–32)
AST SERPL-CCNC: 24 U/L — SIGNIFICANT CHANGE UP (ref 4–32)
BILIRUB SERPL-MCNC: 0.5 MG/DL — SIGNIFICANT CHANGE UP (ref 0.2–1.2)
BILIRUB SERPL-MCNC: 0.5 MG/DL — SIGNIFICANT CHANGE UP (ref 0.2–1.2)
BUN SERPL-MCNC: 17 MG/DL — SIGNIFICANT CHANGE UP (ref 7–23)
BUN SERPL-MCNC: 17 MG/DL — SIGNIFICANT CHANGE UP (ref 7–23)
CALCIUM SERPL-MCNC: 9.2 MG/DL — SIGNIFICANT CHANGE UP (ref 8.4–10.5)
CALCIUM SERPL-MCNC: 9.2 MG/DL — SIGNIFICANT CHANGE UP (ref 8.4–10.5)
CHLORIDE SERPL-SCNC: 105 MMOL/L — SIGNIFICANT CHANGE UP (ref 98–107)
CHLORIDE SERPL-SCNC: 105 MMOL/L — SIGNIFICANT CHANGE UP (ref 98–107)
CO2 SERPL-SCNC: 26 MMOL/L — SIGNIFICANT CHANGE UP (ref 22–31)
CO2 SERPL-SCNC: 26 MMOL/L — SIGNIFICANT CHANGE UP (ref 22–31)
CREAT SERPL-MCNC: 0.89 MG/DL — SIGNIFICANT CHANGE UP (ref 0.5–1.3)
CREAT SERPL-MCNC: 0.89 MG/DL — SIGNIFICANT CHANGE UP (ref 0.5–1.3)
CULTURE RESULTS: SIGNIFICANT CHANGE UP
CULTURE RESULTS: SIGNIFICANT CHANGE UP
EGFR: 64 ML/MIN/1.73M2 — SIGNIFICANT CHANGE UP
EGFR: 64 ML/MIN/1.73M2 — SIGNIFICANT CHANGE UP
GLUCOSE SERPL-MCNC: 96 MG/DL — SIGNIFICANT CHANGE UP (ref 70–99)
GLUCOSE SERPL-MCNC: 96 MG/DL — SIGNIFICANT CHANGE UP (ref 70–99)
HCT VFR BLD CALC: 38.7 % — SIGNIFICANT CHANGE UP (ref 34.5–45)
HCT VFR BLD CALC: 38.7 % — SIGNIFICANT CHANGE UP (ref 34.5–45)
HGB BLD-MCNC: 11.9 G/DL — SIGNIFICANT CHANGE UP (ref 11.5–15.5)
HGB BLD-MCNC: 11.9 G/DL — SIGNIFICANT CHANGE UP (ref 11.5–15.5)
MCHC RBC-ENTMCNC: 22.4 PG — LOW (ref 27–34)
MCHC RBC-ENTMCNC: 22.4 PG — LOW (ref 27–34)
MCHC RBC-ENTMCNC: 30.7 GM/DL — LOW (ref 32–36)
MCHC RBC-ENTMCNC: 30.7 GM/DL — LOW (ref 32–36)
MCV RBC AUTO: 72.9 FL — LOW (ref 80–100)
MCV RBC AUTO: 72.9 FL — LOW (ref 80–100)
NRBC # BLD: 0 /100 WBCS — SIGNIFICANT CHANGE UP (ref 0–0)
NRBC # BLD: 0 /100 WBCS — SIGNIFICANT CHANGE UP (ref 0–0)
NRBC # FLD: 0 K/UL — SIGNIFICANT CHANGE UP (ref 0–0)
NRBC # FLD: 0 K/UL — SIGNIFICANT CHANGE UP (ref 0–0)
PLATELET # BLD AUTO: 303 K/UL — SIGNIFICANT CHANGE UP (ref 150–400)
PLATELET # BLD AUTO: 303 K/UL — SIGNIFICANT CHANGE UP (ref 150–400)
POTASSIUM SERPL-MCNC: 3.8 MMOL/L — SIGNIFICANT CHANGE UP (ref 3.5–5.3)
POTASSIUM SERPL-MCNC: 3.8 MMOL/L — SIGNIFICANT CHANGE UP (ref 3.5–5.3)
POTASSIUM SERPL-SCNC: 3.8 MMOL/L — SIGNIFICANT CHANGE UP (ref 3.5–5.3)
POTASSIUM SERPL-SCNC: 3.8 MMOL/L — SIGNIFICANT CHANGE UP (ref 3.5–5.3)
PROT SERPL-MCNC: 6.9 G/DL — SIGNIFICANT CHANGE UP (ref 6–8.3)
PROT SERPL-MCNC: 6.9 G/DL — SIGNIFICANT CHANGE UP (ref 6–8.3)
RBC # BLD: 5.31 M/UL — HIGH (ref 3.8–5.2)
RBC # BLD: 5.31 M/UL — HIGH (ref 3.8–5.2)
RBC # FLD: 14.6 % — HIGH (ref 10.3–14.5)
RBC # FLD: 14.6 % — HIGH (ref 10.3–14.5)
SODIUM SERPL-SCNC: 141 MMOL/L — SIGNIFICANT CHANGE UP (ref 135–145)
SODIUM SERPL-SCNC: 141 MMOL/L — SIGNIFICANT CHANGE UP (ref 135–145)
SPECIMEN SOURCE: SIGNIFICANT CHANGE UP
SPECIMEN SOURCE: SIGNIFICANT CHANGE UP
WBC # BLD: 8.15 K/UL — SIGNIFICANT CHANGE UP (ref 3.8–10.5)
WBC # BLD: 8.15 K/UL — SIGNIFICANT CHANGE UP (ref 3.8–10.5)
WBC # FLD AUTO: 8.15 K/UL — SIGNIFICANT CHANGE UP (ref 3.8–10.5)
WBC # FLD AUTO: 8.15 K/UL — SIGNIFICANT CHANGE UP (ref 3.8–10.5)

## 2024-01-12 PROCEDURE — 99239 HOSP IP/OBS DSCHRG MGMT >30: CPT

## 2024-01-12 RX ORDER — ACETAMINOPHEN 500 MG
2 TABLET ORAL
Qty: 0 | Refills: 0 | DISCHARGE
Start: 2024-01-12

## 2024-01-12 RX ADMIN — PANTOPRAZOLE SODIUM 40 MILLIGRAM(S): 20 TABLET, DELAYED RELEASE ORAL at 11:48

## 2024-01-12 RX ADMIN — SIMETHICONE 80 MILLIGRAM(S): 80 TABLET, CHEWABLE ORAL at 05:38

## 2024-01-12 RX ADMIN — Medication 81 MILLIGRAM(S): at 11:48

## 2024-01-12 RX ADMIN — AMLODIPINE BESYLATE 5 MILLIGRAM(S): 2.5 TABLET ORAL at 09:28

## 2024-01-12 RX ADMIN — SIMETHICONE 80 MILLIGRAM(S): 80 TABLET, CHEWABLE ORAL at 11:48

## 2024-01-12 NOTE — PROGRESS NOTE ADULT - PROBLEM SELECTOR PLAN 3
Lovenox     Dispo: home  PT consulted - per PT no skilled PT needs    Plan discussed with ACP
Lovenox   Clear diet - ADAT   Dispo  PT consult    Plan discussed with ACP

## 2024-01-12 NOTE — DISCHARGE NOTE PROVIDER - PROVIDER TOKENS
PROVIDER:[TOKEN:[[id number]],FOLLOWUP:[1 week]] PROVIDER:[TOKEN:[[id number]],FOLLOWUP:[1 week]]

## 2024-01-12 NOTE — DISCHARGE NOTE PROVIDER - NSDCCPCAREPLAN_GEN_ALL_CORE_FT
PRINCIPAL DISCHARGE DIAGNOSIS  Diagnosis: Abdominal pain  Assessment and Plan of Treatment: you had ct scan which showed Mild bile duct enlargement, your liver tests were normal, Abdominal sonogram showed gall stones, no infection, your symptoms resoved, follow up with your doctor as outpatient      SECONDARY DISCHARGE DIAGNOSES  Diagnosis: Hypertension  Assessment and Plan of Treatment: cont your meds as prescribed and f/u

## 2024-01-12 NOTE — DISCHARGE NOTE PROVIDER - CARE PROVIDERS DIRECT ADDRESSES
[email or web address]

## 2024-01-12 NOTE — PHYSICAL THERAPY INITIAL EVALUATION ADULT - ADDITIONAL COMMENTS
Pt left semisupine in bed in NAD, all lines intact, call bell in reach, bed alarm on, HR 91 bpm, and RN Melody aware.

## 2024-01-12 NOTE — DISCHARGE NOTE NURSING/CASE MANAGEMENT/SOCIAL WORK - NSDCPEFALRISK_GEN_ALL_CORE
For information on Fall & Injury Prevention, visit: [email or web address] OR  [email or web address] OR  https://www.cdc.gov/steadi/patient.html For information on Fall & Injury Prevention, visit: [email or web address] OR  [email or web address] OR  https://www.cdc.gov/steadi/patient.html

## 2024-01-12 NOTE — DISCHARGE NOTE PROVIDER - CARE PROVIDER_API CALL
Pranay Cleary  Internal Medicine  [address] [place] [address]  Phone: [phone]  Fax: [phone]  Follow Up Time: 1 week   Pranay Cleary  Internal Medicine  [address] [place] [address]  Phone: [phone]  Fax: [phone]  Follow Up Time: 1 week

## 2024-01-12 NOTE — DISCHARGE NOTE NURSING/CASE MANAGEMENT/SOCIAL WORK - NSDCFUADDAPPT_GEN_ALL_CORE_FT
Please call and schedule outpatient follow up with your Acute Care Surgeon   [phone] [place] [address], Department of Surgery, 2nd floor, [place] [address]  ([phone]) Please call and schedule outpatient follow up with your Acute Care Surgeon   [phone] [place] [address], Department of Surgery, 2nd floor, [place] [address]  ([phone])

## 2024-01-12 NOTE — PROGRESS NOTE ADULT - SUBJECTIVE AND OBJECTIVE BOX
Overnight events:   - No acute events    SUBJECTIVE:  Patient was seen and examined on AM rounds. Denies complaints of abdominal pain, fever/chills, SOB, nausea/vomiting. Tolerating diet. Passing gas. Reports pain is resolved, and it was likely due to food poisoning    OBJECTIVE:  Vital Signs Last 24 Hrs  T(C): 36.8 (11 Jan 2024 04:43), Max: 36.9 (10 Ron 2024 22:35)  T(F): 98.2 (11 Jan 2024 04:43), Max: 98.5 (10 Ron 2024 22:35)  HR: 86 (11 Jan 2024 04:43) (86 - 100)  BP: 121/65 (11 Jan 2024 04:43) (121/65 - 149/85)  BP(mean): --  RR: 17 (11 Jan 2024 04:43) (17 - 18)  SpO2: 100% (11 Jan 2024 04:43) (97% - 100%)    Parameters below as of 11 Jan 2024 04:43  Patient On (Oxygen Delivery Method): room air      Physical Examination:  GEN: NAD, resting quietly, AOx3  PULM: symmetric chest rise bilaterally, no increased WOB  ABD: soft, nontender, nondistended, no rebound or guarding  EXTR: no LE erythema, moving all extremities      LABS:                        12.2   9.21  )-----------( 304      ( 11 Jan 2024 07:10 )             39.6       01-11    140  |  104  |  10  ----------------------------<  104<H>  4.8   |  24  |  0.80    Ca    9.4      11 Jan 2024 07:10    TPro  7.3  /  Alb  3.7  /  TBili  0.7  /  DBili  x   /  AST  26  /  ALT  13  /  AlkPhos  53  01-11      
Patient is a 83y old  Female who presents with a chief complaint of abdominal pain (11 Jan 2024 08:56)      SUBJECTIVE / OVERNIGHT EVENTS: Pt seen and examined at 12:47am, no overnight events, pt says that the abdominal pain has resolved, no nausea, vomiting or any other complaints. No other new issues reported.    MEDICATIONS  (STANDING):  amLODIPine   Tablet 5 milliGRAM(s) Oral daily  aspirin  chewable 81 milliGRAM(s) Oral daily  influenza  Vaccine (HIGH DOSE) 0.7 milliLiter(s) IntraMuscular once  pantoprazole  Injectable 40 milliGRAM(s) IV Push daily  simethicone 80 milliGRAM(s) Chew four times a day    MEDICATIONS  (PRN):  acetaminophen     Tablet .. 650 milliGRAM(s) Oral every 6 hours PRN Temp greater or equal to 38C (100.4F), Mild Pain (1 - 3)  aluminum hydroxide/magnesium hydroxide/simethicone Suspension 30 milliLiter(s) Oral every 4 hours PRN Dyspepsia  melatonin 3 milliGRAM(s) Oral at bedtime PRN Insomnia  ondansetron Injectable 4 milliGRAM(s) IV Push every 8 hours PRN Nausea and/or Vomiting      Vital Signs Last 24 Hrs  T(C): 37.1 (11 Jan 2024 12:50), Max: 37.1 (11 Jan 2024 12:50)  T(F): 98.8 (11 Jan 2024 12:50), Max: 98.8 (11 Jan 2024 12:50)  HR: 100 (11 Jan 2024 12:50) (86 - 100)  BP: 103/72 (11 Jan 2024 12:50) (103/72 - 149/85)  BP(mean): --  RR: 16 (11 Jan 2024 12:50) (16 - 18)  SpO2: 100% (11 Jan 2024 12:50) (100% - 100%)    Parameters below as of 11 Jan 2024 12:50  Patient On (Oxygen Delivery Method): room air      CAPILLARY BLOOD GLUCOSE        I&O's Summary      PHYSICAL EXAM:  GENERAL: NAD  CHEST/LUNG: Clear to auscultation bilaterally; No wheeze  HEART: Regular rate and rhythm  ABDOMEN: Soft, Nontender, Nondistended  EXTREMITIES: no LE edema  PSYCH: Calm  NEUROLOGY: AA answers questions appropriately  SKIN: No rashes or lesions    LABS:                        12.2   9.21  )-----------( 304      ( 11 Jan 2024 07:10 )             39.6     01-11    140  |  104  |  10  ----------------------------<  104<H>  4.8   |  24  |  0.80    Ca    9.4      11 Jan 2024 07:10    TPro  7.3  /  Alb  3.7  /  TBili  0.7  /  DBili  x   /  AST  26  /  ALT  13  /  AlkPhos  53  01-11          Urinalysis Basic - ( 11 Jan 2024 07:10 )    Color: x / Appearance: x / SG: x / pH: x  Gluc: 104 mg/dL / Ketone: x  / Bili: x / Urobili: x   Blood: x / Protein: x / Nitrite: x   Leuk Esterase: x / RBC: x / WBC x   Sq Epi: x / Non Sq Epi: x / Bacteria: x        RADIOLOGY & ADDITIONAL TESTS:    Imaging Personally Reviewed:    Consultant(s) Notes Reviewed:      Care Discussed with Consultants/Other Providers:  
Patient is a 83y old  Female who presents with a chief complaint of abdominal pain (12 Jan 2024 08:25)      SUBJECTIVE / OVERNIGHT EVENTS: Pt seen and examined at 10:18am, no overnight events, pt says that the abdominal pain has resolved, no nausea, vomiting or any other complaints. No other new issues reported.      MEDICATIONS  (STANDING):  amLODIPine   Tablet 5 milliGRAM(s) Oral daily  aspirin  chewable 81 milliGRAM(s) Oral daily  enoxaparin Injectable 40 milliGRAM(s) SubCutaneous every 24 hours  influenza  Vaccine (HIGH DOSE) 0.7 milliLiter(s) IntraMuscular once  pantoprazole  Injectable 40 milliGRAM(s) IV Push daily    MEDICATIONS  (PRN):  acetaminophen     Tablet .. 650 milliGRAM(s) Oral every 6 hours PRN Temp greater or equal to 38C (100.4F), Mild Pain (1 - 3)  aluminum hydroxide/magnesium hydroxide/simethicone Suspension 30 milliLiter(s) Oral every 4 hours PRN Dyspepsia  melatonin 3 milliGRAM(s) Oral at bedtime PRN Insomnia  ondansetron Injectable 4 milliGRAM(s) IV Push every 8 hours PRN Nausea and/or Vomiting      Vital Signs Last 24 Hrs  T(C): 36.7 (12 Jan 2024 09:25), Max: 37.1 (11 Jan 2024 12:50)  T(F): 98 (12 Jan 2024 09:25), Max: 98.8 (11 Jan 2024 12:50)  HR: 90 (12 Jan 2024 09:25) (90 - 100)  BP: 114/60 (12 Jan 2024 09:25) (103/72 - 137/63)  BP(mean): --  RR: 16 (12 Jan 2024 09:25) (16 - 18)  SpO2: 100% (12 Jan 2024 09:25) (100% - 100%)    Parameters below as of 12 Jan 2024 09:25  Patient On (Oxygen Delivery Method): room air      CAPILLARY BLOOD GLUCOSE        I&O's Summary      PHYSICAL EXAM:  GENERAL: NAD  CHEST/LUNG: Clear to auscultation bilaterally; No wheeze  HEART: Regular rate and rhythm  ABDOMEN: Soft, Nontender, Nondistended  EXTREMITIES: no LE edema  PSYCH: Calm  NEUROLOGY: AA answers questions appropriately  SKIN: No rashes or lesions    LABS:                        11.9   8.15  )-----------( 303      ( 12 Jan 2024 07:11 )             38.7     01-12    141  |  105  |  17  ----------------------------<  96  3.8   |  26  |  0.89    Ca    9.2      12 Jan 2024 07:11    TPro  6.9  /  Alb  3.6  /  TBili  0.5  /  DBili  x   /  AST  24  /  ALT  15  /  AlkPhos  48  01-12          Urinalysis Basic - ( 12 Jan 2024 07:11 )    Color: x / Appearance: x / SG: x / pH: x  Gluc: 96 mg/dL / Ketone: x  / Bili: x / Urobili: x   Blood: x / Protein: x / Nitrite: x   Leuk Esterase: x / RBC: x / WBC x   Sq Epi: x / Non Sq Epi: x / Bacteria: x        RADIOLOGY & ADDITIONAL TESTS:    Imaging Personally Reviewed:    Consultant(s) Notes Reviewed:      Care Discussed with Consultants/Other Providers:

## 2024-01-12 NOTE — DISCHARGE NOTE PROVIDER - HOSPITAL COURSE
83 year old female with PMhx of HTN, acute cholecystitis treated medically in 2020 presents with abdominal pain, nausea, and vomiting. Patient found to have Cholelithiasis without sonographic evidence of acute cholecystitis. on abdominal ultrasound with no bowel obstruction on CT A/P, admitted for further management.        Abdominal pain.   -Unclear etiology of abdominal discomfort. Patient with  imaging with no intra abdominal pathology. Mild intra and extrahepatic biliary ductal dilatation. lfts wnl, BOWEL: No bowel obstruction or inflammation. Appendix is normal on CT A/P and Cholelithiasis without sonographic evidence of acute cholecystitis on abdominal ultrasound   - surgery consulted, appreciate recs, symptoms have resolved, pt can wkup as outpt if symptoms recurs  - GI consulted??? no note in the chart, no need for consult at this time as her symptoms have resolved  - tolerating diet    -Hypertension.    continue amlodipine 5mg   - Continue aspirin 81mg.    stable for dc home

## 2024-01-12 NOTE — DISCHARGE NOTE NURSING/CASE MANAGEMENT/SOCIAL WORK - PATIENT PORTAL LINK FT
You can access the FollowMyHealth Patient Portal offered by [facility] by registering at the following website: [email or web address]. By joining [employer]’s FollowMyHealth portal, you will also be able to view your health information using other applications (apps) compatible with our system. You can access the FollowMyHealth Patient Portal offered by [facility] by registering at the following website: [email or web address]. By joining [employer]’s FollowMyHealth portal, you will also be able to view your health information using other applications (apps) compatible with our system.

## 2024-01-12 NOTE — PHYSICAL THERAPY INITIAL EVALUATION ADULT - GENERAL OBSERVATIONS, REHAB EVAL
Pt encountered in semi-supine position in NAD, all lines intact, a&ox4, HR 91 bpm, BP: 114/59 mmHg, and DESIREE Oliver aware.

## 2024-01-12 NOTE — PHYSICAL THERAPY INITIAL EVALUATION ADULT - NSPTDISCHREC_GEN_A_CORE
Pt is functionally independent and not in need of PT. Pt will be kept on PT program to prevent deconditioning./No skilled PT needs

## 2024-01-12 NOTE — PROGRESS NOTE ADULT - PROBLEM SELECTOR PLAN 1
Unclear etiology of abdominal discomfort. Patient with  imaging with no intra abdominal pathology. , BOWEL: No bowel obstruction or inflammation. Appendix is normal on CT A/P and Cholelithiasis without sonographic evidence of acute cholecystitis on abdominal ultrasound   - surgery consulted, appreciate recs   - GI consulted??? no need for consult at this time as her symptoms have resolved  - tolerating diet  -dc today, dc planning time spent in coordination 40mts ( discussion with pt's daughter, acp, preparing dc summary and plan)
Unclear etiology of abdominal discomfort. Patient with  imaging with no intra abdominal pathology. , BOWEL: No bowel obstruction or inflammation. Appendix is normal on CT A/P and Cholelithiasis without sonographic evidence of acute cholecystitis on abdominal ultrasound   - surgery consulted, appreciate recs   - GI consulted?  Awaiting Recs  - Patient reports that her symptoms have resolved  - advance diet as tolerated  -if stable dc planning for tomorrow

## 2024-01-12 NOTE — DISCHARGE NOTE PROVIDER - NSDCFUADDAPPT_GEN_ALL_CORE_FT
Please call and schedule outpatient follow up with your Acute Care Surgeon   [phone] [place] [address], Department of Surgery, 2nd floor, [place] [address]  ([phone]) Please call and schedule outpatient follow up with your Acute Care Surgeon   [phone] [place] [address], Department of Surgery, 2nd floor, [place] [address]  ([phone])

## 2024-01-12 NOTE — PHYSICAL THERAPY INITIAL EVALUATION ADULT - PERTINENT HX OF CURRENT PROBLEM, REHAB EVAL
Patient is a 83 year old Female, PMH stated below, presents with abdominal pain; found to have Cholelithiasis without sonographic evidence of acute cholecystitis. on abdominal ultrasound with no bowel obstruction on CT A/P,.

## 2024-01-12 NOTE — PROGRESS NOTE ADULT - PROBLEM SELECTOR PLAN 2
- continue amlodipine 5mg   - Continue aspirin 81mg
- continue amlodipine 5mg   - Continue aspirin 81mg

## 2024-01-12 NOTE — DISCHARGE NOTE PROVIDER - NSDCMRMEDTOKEN_GEN_ALL_CORE_FT
aspirin 81 mg oral tablet, chewable: 1 tab(s) orally once a day  Norvasc 5 mg oral tablet: 1 tab(s) orally once a day   acetaminophen 325 mg oral tablet: 2 tab(s) orally every 6 hours As needed Temp greater or equal to 38C (100.4F), Mild Pain (1 - 3)  aspirin 81 mg oral tablet, chewable: 1 tab(s) orally once a day  Norvasc 5 mg oral tablet: 1 tab(s) orally once a day

## 2024-04-01 NOTE — DISCHARGE NOTE PROVIDER - NSDCQMSTROKE_NEU_ALL_CORE
Speech Therapy    Patient not seen in therapy.    Re-attempt plan: per established plan of care    Additional details:  Patient intubated since last SLP encounter; speech therapy to sign off. Please re-consult when appropriate to resume dysphagia intervention.      OBJECTIVE                          Therapy procedure time and total treatment time can be found documented on the Time Entry flowsheet   No

## 2024-05-09 ENCOUNTER — APPOINTMENT (OUTPATIENT)
Dept: ORTHOPEDIC SURGERY | Facility: CLINIC | Age: 84
End: 2024-05-09

## 2024-08-13 NOTE — PATIENT PROFILE ADULT - NSPROSPHOSPCHAPLAINYN_GEN_A_NUR
Patient is in the supine position.   The body was positioned using the following devices: safety strap.  The head was positioned using the following devices: safety strap and gel pad mattress. no